# Patient Record
Sex: MALE | Race: WHITE | NOT HISPANIC OR LATINO | Employment: FULL TIME | URBAN - METROPOLITAN AREA
[De-identification: names, ages, dates, MRNs, and addresses within clinical notes are randomized per-mention and may not be internally consistent; named-entity substitution may affect disease eponyms.]

---

## 2017-03-20 ENCOUNTER — MYC MEDICAL ADVICE (OUTPATIENT)
Dept: FAMILY MEDICINE | Facility: CLINIC | Age: 57
End: 2017-03-20

## 2017-03-20 DIAGNOSIS — E03.9 ACQUIRED HYPOTHYROIDISM: ICD-10-CM

## 2017-03-22 RX ORDER — LEVOTHYROXINE SODIUM 88 UG/1
88 TABLET ORAL DAILY
Qty: 30 TABLET | Refills: 5 | Status: SHIPPED | OUTPATIENT
Start: 2017-03-22 | End: 2017-06-27 | Stop reason: DRUGHIGH

## 2017-03-22 NOTE — TELEPHONE ENCOUNTER
Please see mychart message from patient below. Please message patient back regarding your recommendations.  Anastasiia Castaneda CMA

## 2017-06-22 ENCOUNTER — DOCUMENTATION ONLY (OUTPATIENT)
Dept: LAB | Facility: CLINIC | Age: 57
End: 2017-06-22

## 2017-06-22 DIAGNOSIS — E03.9 HYPOTHYROIDISM, UNSPECIFIED TYPE: Primary | ICD-10-CM

## 2017-06-22 DIAGNOSIS — E03.9 HYPOTHYROIDISM, UNSPECIFIED TYPE: ICD-10-CM

## 2017-06-22 PROCEDURE — 84443 ASSAY THYROID STIM HORMONE: CPT | Performed by: FAMILY MEDICINE

## 2017-06-22 PROCEDURE — 84439 ASSAY OF FREE THYROXINE: CPT | Performed by: FAMILY MEDICINE

## 2017-06-22 PROCEDURE — 36415 COLL VENOUS BLD VENIPUNCTURE: CPT | Performed by: FAMILY MEDICINE

## 2017-06-22 NOTE — PROGRESS NOTES
Called patient and left message for patient to return call to team purple hotline.  Sandy Covington MA

## 2017-06-22 NOTE — PROGRESS NOTES
Call patient. He needs to be seen for annual male exam and health maintenance cares.   FREDO SCHILLING M.D.

## 2017-06-22 NOTE — LETTER
[facility]  [address]  [place] [address]  [phone]          July 3, 2017    Shaggy Pierce                                                                                                                     [address]  [place] [address]            Dear Shaggy,    We have been trying to reach you by phone, but have been unable to do so.    Our records show that you are due for an annual male exam and health maintenance cares.     Please call [phone], to schedule a visit.  Feel free to contact us with any questions or concerns.  Thank you.    Sincerely,         Jenae Gray MD/onel

## 2017-06-23 LAB
T4 FREE SERPL-MCNC: 1.09 NG/DL (ref 0.76–1.46)
TSH SERPL DL<=0.005 MIU/L-ACNC: 5.3 MU/L (ref 0.4–4)

## 2017-06-27 ENCOUNTER — MYC MEDICAL ADVICE (OUTPATIENT)
Dept: FAMILY MEDICINE | Facility: CLINIC | Age: 57
End: 2017-06-27

## 2017-06-27 DIAGNOSIS — E03.9 HYPOTHYROIDISM, UNSPECIFIED TYPE: Primary | ICD-10-CM

## 2017-06-27 DIAGNOSIS — E03.9 HYPOTHYROIDISM, UNSPECIFIED TYPE: ICD-10-CM

## 2017-06-27 RX ORDER — LEVOTHYROXINE SODIUM 100 UG/1
100 TABLET ORAL DAILY
Qty: 90 TABLET | Refills: 3 | Status: SHIPPED | OUTPATIENT
Start: 2017-06-27 | End: 2017-06-28

## 2017-06-28 RX ORDER — LEVOTHYROXINE SODIUM 100 UG/1
100 TABLET ORAL DAILY
Qty: 30 TABLET | Refills: 0 | Status: SHIPPED | OUTPATIENT
Start: 2017-06-28 | End: 2019-09-03

## 2017-07-03 NOTE — PROGRESS NOTES
Please send a letter to patient. Patient is due for physical. Unable to reach patient by phone.   Sandy Covington MA

## 2017-08-22 NOTE — PROGRESS NOTES
SUBJECTIVE:   Shaggy Pierce is a 56 year old male who presents to clinic today for the following health issues:      Medication Followup of Levothyroxine    Taking Medication as prescribed: yes    Side Effects:  Blood in semen x1 week    Medication Helping Symptoms: Unsure, but feels a little less tired             Problem list and histories reviewed & adjusted, as indicated.  Additional history: as documented    Patient Active Problem List   Diagnosis     CARDIOVASCULAR SCREENING; LDL GOAL LESS THAN 160     Nasal septal defect     Hypothyroidism, unspecified type     History of colonic polyps     Past Surgical History:   Procedure Laterality Date     COLONOSCOPY       COSMETIC SURGERY       HERNIA REPAIR  3/11     SEPTOPLASTY         Social History   Substance Use Topics     Smoking status: Never Smoker     Smokeless tobacco: Former User     Quit date: 1990     Alcohol use No     Family History   Problem Relation Age of Onset     Eye Disorder Mother      Thyroid Disease Mother      CANCER Father 63      of leukemia due to chemical exposure in      Cancer - colorectal Maternal Grandfather      HEART DISEASE Paternal Grandfather          Current Outpatient Prescriptions   Medication Sig Dispense Refill     levothyroxine (SYNTHROID/LEVOTHROID) 100 MCG tablet Take 1 tablet (100 mcg) by mouth daily 30 tablet 0     fluticasone (FLONASE) 50 MCG/ACT nasal spray Spray 2 sprays into both nostrils daily       Allergies   Allergen Reactions     Aspirin [Bufferin Low Dose] Hives     Seasonal Allergies      BP Readings from Last 3 Encounters:   17 (!) 140/98   16 132/80   07/29/15 136/85    Wt Readings from Last 3 Encounters:   17 217 lb (98.4 kg)   16 218 lb (98.9 kg)   07/29/15 208 lb 8 oz (94.6 kg)                  Labs reviewed in EPIC        Reviewed and updated as needed this visit by clinical staff       Reviewed and updated as needed this visit by Provider          ROS:  This 56 year old male is here today to get his thyroid rechecked. His synthroid was increased to 100 mcg 2 months ago and he has tolerated it well. He works full time down town [employer] and tries to stay active. However, he hasn't been able to ride his bike much this summer as he has a mass that has developed on his lower medial right knee. Sometimes it gets tender. No pain with walking, though. He still has his umbilical hernia and is not ready to have surgery yet.   He is concerned because he has seen blood in his seman more than once in the past 2 weeks. No dysuria. No family history of prostate cancer  However, he has a history of polyps and his paternal grandpa had colon cancer. He is due for another colonoscopy this year.   All other review of systems are negative  Personal, family, and social history reviewed with patient and revised.         OBJECTIVE:     BP (!) 140/98 (BP Location: Right arm, Cuff Size: Adult Regular)  Pulse 78  Temp 98.8  F (37.1  C) (Oral)  Wt 217 lb (98.4 kg)  SpO2 95%  BMI 32.99 kg/m2  Body mass index is 32.99 kg/(m^2).  GENERAL: healthy, alert and no distress  NECK: no adenopathy, no asymmetry, masses, or scars and thyroid normal to palpation  RESP: lungs clear to auscultation - no rales, rhonchi or wheezes  CV: regular rate and rhythm, normal S1 S2, no S3 or S4, no murmur, click or rub, no peripheral edema and peripheral pulses strong  ABDOMEN: soft, nontender, no hepatosplenomegaly, no masses and bowel sounds normal, but he has a reducible umbilical hernia. A little tender when I reduce it  No pain over his suprapubic area   MS: no gross musculoskeletal defects noted, no edema, but he has a non-tender mass over his medial, inferior right knee. Range of motion of knee is normal. Mass is very slightly tender.   Brisk gait with no limp   Well hydrated  Well nourished  Well groomed  Alert and oriented X 3  Good spirits    Diagnostic Test Results:  none      ASSESSMENT/PLAN:              1. Hypothyroidism, unspecified type  As above   - TSH with free T4 reflex    2. Hematospermia  As above   - UROLOGY ADULT REFERRAL  - UA reflex to Microscopic and Culture    3. History of colonic polyps  As above   - GASTROENTEROLOGY ADULT REF PROCEDURE ONLY    4. Mass of right knee  As above   - ORTHOPEDICS ADULT REFERRAL    5. Umbilical hernia without obstruction and without gangrene  As above, he is willing to wait on surgery     6. Screen for colon cancer  due  - GASTROENTEROLOGY ADULT REF PROCEDURE ONLY    7. Need for hepatitis C screening test  due  - Hepatitis C Screen Reflex to HCV RNA Quant and Genotype    8. CARDIOVASCULAR SCREENING; LDL GOAL LESS THAN 160  due  - Lipid panel reflex to direct LDL    9. Screening for diabetes mellitus  Due   - Glucose    Return to clinic if no improvement     FREDO SCHILLING MD  [facility] [place]    Results for orders placed or performed in visit on 08/25/17   Hepatitis C Screen Reflex to HCV RNA Quant and Genotype   Result Value Ref Range    Hepatitis C Antibody Nonreactive NR^Nonreactive   Lipid panel reflex to direct LDL   Result Value Ref Range    Cholesterol 228 (H) <200 mg/dL    Triglycerides 156 (H) <150 mg/dL    HDL Cholesterol 43 >39 mg/dL    LDL Cholesterol Calculated 154 (H) <100 mg/dL    Non HDL Cholesterol 185 (H) <130 mg/dL   Glucose   Result Value Ref Range    Glucose 90 70 - 99 mg/dL   UA reflex to Microscopic and Culture   Result Value Ref Range    Color Urine Yellow     Appearance Urine Clear     Glucose Urine Negative NEG^Negative mg/dL    Bilirubin Urine Negative NEG^Negative    Ketones Urine Negative NEG^Negative mg/dL    Specific Gravity Urine 1.020 1.003 - 1.035    Blood Urine Negative NEG^Negative    pH Urine 6.0 5.0 - 7.0 pH    Protein Albumin Urine Negative NEG^Negative mg/dL    Urobilinogen Urine 0.2 0.2 - 1.0 EU/dL    Nitrite Urine Negative NEG^Negative    Leukocyte Esterase Urine Negative NEG^Negative     Source Midstream Urine    TSH with free T4 reflex   Result Value Ref Range    TSH 3.17 0.40 - 4.00 mU/L   patient will be started on lipitor 20 mg daily.    FREDO SCHILLING M.D.

## 2017-08-25 ENCOUNTER — OFFICE VISIT (OUTPATIENT)
Dept: FAMILY MEDICINE | Facility: CLINIC | Age: 57
End: 2017-08-25
Payer: COMMERCIAL

## 2017-08-25 VITALS
WEIGHT: 217 LBS | BODY MASS INDEX: 32.99 KG/M2 | HEART RATE: 78 BPM | TEMPERATURE: 98.8 F | SYSTOLIC BLOOD PRESSURE: 140 MMHG | OXYGEN SATURATION: 95 % | DIASTOLIC BLOOD PRESSURE: 98 MMHG

## 2017-08-25 DIAGNOSIS — K42.9 UMBILICAL HERNIA WITHOUT OBSTRUCTION AND WITHOUT GANGRENE: ICD-10-CM

## 2017-08-25 DIAGNOSIS — R36.1 HEMATOSPERMIA: ICD-10-CM

## 2017-08-25 DIAGNOSIS — E78.5 HYPERLIPIDEMIA LDL GOAL <130: ICD-10-CM

## 2017-08-25 DIAGNOSIS — Z12.11 SCREEN FOR COLON CANCER: ICD-10-CM

## 2017-08-25 DIAGNOSIS — Z86.0100 HISTORY OF COLONIC POLYPS: ICD-10-CM

## 2017-08-25 DIAGNOSIS — R22.41 MASS OF RIGHT KNEE: ICD-10-CM

## 2017-08-25 DIAGNOSIS — Z13.1 SCREENING FOR DIABETES MELLITUS: ICD-10-CM

## 2017-08-25 DIAGNOSIS — Z11.59 NEED FOR HEPATITIS C SCREENING TEST: ICD-10-CM

## 2017-08-25 DIAGNOSIS — Z13.6 CARDIOVASCULAR SCREENING; LDL GOAL LESS THAN 160: ICD-10-CM

## 2017-08-25 DIAGNOSIS — E03.9 HYPOTHYROIDISM, UNSPECIFIED TYPE: Primary | ICD-10-CM

## 2017-08-25 LAB
ALBUMIN UR-MCNC: NEGATIVE MG/DL
APPEARANCE UR: CLEAR
BILIRUB UR QL STRIP: NEGATIVE
CHOLEST SERPL-MCNC: 228 MG/DL
COLOR UR AUTO: YELLOW
GLUCOSE SERPL-MCNC: 90 MG/DL (ref 70–99)
GLUCOSE UR STRIP-MCNC: NEGATIVE MG/DL
HDLC SERPL-MCNC: 43 MG/DL
HGB UR QL STRIP: NEGATIVE
KETONES UR STRIP-MCNC: NEGATIVE MG/DL
LDLC SERPL CALC-MCNC: 154 MG/DL
LEUKOCYTE ESTERASE UR QL STRIP: NEGATIVE
NITRATE UR QL: NEGATIVE
NONHDLC SERPL-MCNC: 185 MG/DL
PH UR STRIP: 6 PH (ref 5–7)
SOURCE: NORMAL
SP GR UR STRIP: 1.02 (ref 1–1.03)
TRIGL SERPL-MCNC: 156 MG/DL
TSH SERPL DL<=0.005 MIU/L-ACNC: 3.17 MU/L (ref 0.4–4)
UROBILINOGEN UR STRIP-ACNC: 0.2 EU/DL (ref 0.2–1)

## 2017-08-25 PROCEDURE — 36415 COLL VENOUS BLD VENIPUNCTURE: CPT | Performed by: FAMILY MEDICINE

## 2017-08-25 PROCEDURE — 84443 ASSAY THYROID STIM HORMONE: CPT | Performed by: FAMILY MEDICINE

## 2017-08-25 PROCEDURE — 80061 LIPID PANEL: CPT | Performed by: FAMILY MEDICINE

## 2017-08-25 PROCEDURE — 99214 OFFICE O/P EST MOD 30 MIN: CPT | Performed by: FAMILY MEDICINE

## 2017-08-25 PROCEDURE — 82947 ASSAY GLUCOSE BLOOD QUANT: CPT | Performed by: FAMILY MEDICINE

## 2017-08-25 PROCEDURE — 86803 HEPATITIS C AB TEST: CPT | Performed by: FAMILY MEDICINE

## 2017-08-25 PROCEDURE — 81003 URINALYSIS AUTO W/O SCOPE: CPT | Performed by: FAMILY MEDICINE

## 2017-08-25 ASSESSMENT — PAIN SCALES - GENERAL: PAINLEVEL: NO PAIN (0)

## 2017-08-25 NOTE — NURSING NOTE
"Chief Complaint   Patient presents with     Recheck Medication     Levothyroxine dose increase follow-up. Blood is semen possibly due to med increase. Pt is fasting.       Initial BP (!) 140/98 (BP Location: Right arm, Cuff Size: Adult Regular)  Pulse 78  Temp 98.8  F (37.1  C) (Oral)  Wt 217 lb (98.4 kg)  SpO2 95%  BMI 32.99 kg/m2 Estimated body mass index is 32.99 kg/(m^2) as calculated from the following:    Height as of 3/31/16: 5' 8\" (1.727 m).    Weight as of this encounter: 217 lb (98.4 kg).  Medication Reconciliation: complete       Adriana Arreaga CMA      "

## 2017-08-25 NOTE — PATIENT INSTRUCTIONS
[facility]    If you have any questions regarding to your visit please contact your care team:       Team Purple:   Clinic Hours Telephone Number   Dr. Jenae Faulkner     7am-7pm  Monday - Thursday   7am-5pm  Fridays  [phone]  (Appointment scheduling available 24/7)    Questions about your Visit?   Team Line:  [phone]   Urgent Care - [place] and [facility] - 11am-9pm Monday-Friday Saturday-Sunday- 9am-5pm   [place] - 5pm-9pm Monday-Friday Saturday-Sunday- 9am-5pm  [phone] - [facility]  [phone] - [place]       What options do I have for visits at the clinic other than the traditional office visit?  To expand how we care for you, many of our providers are utilizing electronic visits (e-visits) and telephone visits, when medically appropriate, for interactions with their patients rather than a visit in the clinic.   We also offer nurse visits for many medical concerns. Just like any other service, we will bill your insurance company for this type of visit based on time spent on the phone with your provider. Not all insurance companies cover these visits. Please check with your medical insurance if this type of visit is covered. You will be responsible for any charges that are not paid by your insurance.      E-visits via C3[employer]:  generally incur a $35.00 fee.  Telephone visits:  Time spent on the phone: *charged based on time that is spent on the phone in increments of 10 minutes. Estimated cost:   5-10 mins $30.00   11-20 mins. $59.00   21-30 mins. $85.00     Use [employer] (secure email communication and access to your chart) to send your primary care provider a message or make an appointment. Ask someone on your Team how to sign up for [employer].  For a Price Quote for your services, please call our Consumer Price Line at [phone].  As always, Thank you for trusting us with your health care needs!

## 2017-08-25 NOTE — MR AVS SNAPSHOT
After Visit Summary   8/25/2017    Shaggy Pierce    MRN: [id number]           Patient Information     Date Of Birth          1960        Visit Information        Provider Department      8/25/2017 10:30 AM Jenae Gray MD [facility]        Today's Diagnoses     Hypothyroidism, unspecified type    -  1    Hematospermia        Screen for colon cancer        History of colonic polyps        Mass of right knee        Need for hepatitis C screening test        CARDIOVASCULAR SCREENING; LDL GOAL LESS THAN 160        Screening for diabetes mellitus          Care Instructions    [facility]    If you have any questions regarding to your visit please contact your care team:       Team Purple:   Clinic Hours Telephone Number   Dr. Jenae Faulkner     7am-7pm  Monday - Thursday   7am-5pm  Fridays  [phone]  (Appointment scheduling available 24/7)    Questions about your Visit?   Team Line:  [phone]   [facility] [place] [facility] 11am-9pm Monday-Friday Saturday-Sunday- 9am-5pm   [place] - 5pm-9pm Monday-Friday Saturday-Sunday- 9am-5pm  [phone] - [place]   [phone] - [place]       What options do I have for visits at the clinic other than the traditional office visit?  To expand how we care for you, many of our providers are utilizing electronic visits (e-visits) and telephone visits, when medically appropriate, for interactions with their patients rather than a visit in the clinic.   We also offer nurse visits for many medical concerns. Just like any other service, we will bill your insurance company for this type of visit based on time spent on the phone with your provider. Not all insurance companies cover these visits. Please check with your medical insurance if this type of visit is covered. You will be responsible for any charges that are not paid by your insurance.      E-visits  via Rafter:  generally incur a $35.00 fee.  Telephone visits:  Time spent on the phone: *charged based on time that is spent on the phone in increments of 10 minutes. Estimated cost:   5-10 mins $30.00   11-20 mins. $59.00   21-30 mins. $85.00     Use ViralGainshart (secure email communication and access to your chart) to send your primary care provider a message or make an appointment. Ask someone on your Team how to sign up for Rafter.  For a Price Quote for your services, please call our [employer] Price Line at [phone].  As always, Thank you for trusting us with your health care needs!              Follow-ups after your visit        Additional Services     GASTROENTEROLOGY ADULT REF PROCEDURE ONLY       Last Lab Result: No results found for: CR  There is no height or weight on file to calculate BMI.     Needed:  No  Language:  English    Patient will be contacted to schedule procedure.     Please be aware that coverage of these services is subject to the terms and limitations of your health insurance plan.  Call member services at your health plan with any benefit or coverage questions.  Any procedures must be performed at a [facility] facility OR coordinated by your clinic's referral office.    Please bring the following with you to your appointment:    (1) Any X-Rays, CTs or MRIs which have been performed.  Contact the facility where they were done to arrange for  prior to your scheduled appointment.    (2) List of current medications   (3) This referral request   (4) Any documents/labs given to you for this referral            ORTHOPEDICS ADULT REFERRAL       Your provider has referred you to: [facility] [place] [phone]    [email or web address]    Please be aware that coverage of these services is subject to the terms and limitations of your health insurance plan.  Call member services at your health plan with any benefit or coverage questions.       Please bring the following to your appointment:    >>   Any x-rays, CTs or MRIs which have been performed.  Contact the facility where they were done to arrange for  prior to your scheduled appointment.    >>   List of current medications   >>   This referral request   >>   Any documents/labs given to you for this referral            UROLOGY ADULT REFERRAL       Your provider has referred you to: [facility]: [facility] [phone]   [email or web address]    Please be aware that coverage of these services is subject to the terms and limitations of your health insurance plan.  Call member services at your health plan with any benefit or coverage questions.      Please bring the following with you to your appointment:    (1) Any X-Rays, CTs or MRIs which have been performed.  Contact the facility where they were done to arrange for  prior to your scheduled appointment.    (2) List of current medications  (3) This referral request   (4) Any documents/labs given to you for this referral                  Who to contact     If you have questions or need follow up information about today's clinic visit or your schedule please contact [facility] directly at [phone].  Normal or non-critical lab and imaging results will be communicated to you by MyChart, letter or phone within 4 business days after the clinic has received the results. If you do not hear from us within 7 days, please contact the clinic through MyChart or phone. If you have a critical or abnormal lab result, we will notify you by phone as soon as possible.  Submit refill requests through [employer] or call your pharmacy and they will forward the refill request to us. Please allow 3 business days for your refill to be completed.          Additional Information About Your Visit        [employer] Information     [employer] gives you secure access to your electronic health record. If you see a  primary care provider, you can also send messages to your care team and make appointments. If you have questions, please call your primary care clinic.  If you do not have a primary care provider, please call [phone] and they will assist you.        Care EveryWhere ID     This is your Care EveryWhere ID. This could be used by other organizations to access your [facility] medical records  [id number]        Your Vitals Were     Pulse Temperature Pulse Oximetry BMI (Body Mass Index)          78 98.8  F (37.1  C) (Oral) 95% 32.99 kg/m2         Blood Pressure from Last 3 Encounters:   08/25/17 (!) 140/98   03/31/16 132/80   07/29/15 136/85    Weight from Last 3 Encounters:   08/25/17 217 lb (98.4 kg)   03/31/16 218 lb (98.9 kg)   07/29/15 208 lb 8 oz (94.6 kg)              We Performed the Following     GASTROENTEROLOGY ADULT REF PROCEDURE ONLY     Glucose     Hepatitis C Screen Reflex to HCV RNA Quant and Genotype     Lipid panel reflex to direct LDL     ORTHOPEDICS ADULT REFERRAL     TSH with free T4 reflex     UA reflex to Microscopic and Culture     UROLOGY ADULT REFERRAL        Primary Care Provider Office Phone # Fax #    Jenae Gray [phone]       [address] [address]        Equal Access to Services     BRIA TAYLOR AH: Hadii aad ku hadasho Soomaali, waaxda luqadaha, qaybta kaalmada adeegyada, waxay ruben hayfay vazquez. So [facility] [place] [facility] [phone].    ATENCIÓN: Si habla español, tiene a silverman disposición servicios gratuitos de asistencia lingüística. Llame al [phone].    We comply with applicable federal civil rights laws and [place] laws. We do not discriminate on the basis of race, color, national origin, age, disability sex, sexual orientation or gender identity.            Thank you!     Thank you for choosing [facility]  for your care. Our goal is always to provide you with excellent care. Hearing  back from our patients is one way we can continue to improve our services. Please take a few minutes to complete the written survey that you may receive in the mail after your visit with us. Thank you!             Your Updated Medication List - Protect others around you: Learn how to safely use, store and throw away your medicines at www.disposemymeds.org.          This list is accurate as of: 8/25/17 10:52 AM.  Always use your most recent med list.                   Brand Name Dispense Instructions for use Diagnosis    FLONASE 50 MCG/ACT spray   Generic drug:  fluticasone      Spray 2 sprays into both nostrils daily        levothyroxine 100 MCG tablet    SYNTHROID/LEVOTHROID    30 tablet    Take 1 tablet (100 mcg) by mouth daily    Hypothyroidism, unspecified type

## 2017-08-25 NOTE — LETTER
[facility]  [address] [place] [address]    August 29, 2017    Shaggy Pierce  [address]  [place] [address]          Dear Shaggy,    Your lipid panel has worsened significantly since your last lipid panel was done. I have sent a prescription for lipitor to your [facility] pharmacy. Please take one daily with your evening meal. See me again in 2 months to recheck your cholesterol, fasting    Enclosed is a copy of your results.     Results for orders placed or performed in visit on 08/25/17   Hepatitis C Screen Reflex to HCV RNA Quant and Genotype   Result Value Ref Range    Hepatitis C Antibody Nonreactive NR^Nonreactive   Lipid panel reflex to direct LDL   Result Value Ref Range    Cholesterol 228 (H) <200 mg/dL    Triglycerides 156 (H) <150 mg/dL    HDL Cholesterol 43 >39 mg/dL    LDL Cholesterol Calculated 154 (H) <100 mg/dL    Non HDL Cholesterol 185 (H) <130 mg/dL   Glucose   Result Value Ref Range    Glucose 90 70 - 99 mg/dL   UA reflex to Microscopic and Culture   Result Value Ref Range    Color Urine Yellow     Appearance Urine Clear     Glucose Urine Negative NEG^Negative mg/dL    Bilirubin Urine Negative NEG^Negative    Ketones Urine Negative NEG^Negative mg/dL    Specific Gravity Urine 1.020 1.003 - 1.035    Blood Urine Negative NEG^Negative    pH Urine 6.0 5.0 - 7.0 pH    Protein Albumin Urine Negative NEG^Negative mg/dL    Urobilinogen Urine 0.2 0.2 - 1.0 EU/dL    Nitrite Urine Negative NEG^Negative    Leukocyte Esterase Urine Negative NEG^Negative    Source Midstream Urine    TSH with free T4 reflex   Result Value Ref Range    TSH 3.17 0.40 - 4.00 mU/L       If you have any questions or concerns, please call myself or my nurse at [phone].      Sincerely,        Jenae Gray MD/JER

## 2017-08-27 LAB — HCV AB SERPL QL IA: NONREACTIVE

## 2017-08-29 PROBLEM — E78.5 HYPERLIPIDEMIA LDL GOAL <130: Status: ACTIVE | Noted: 2017-08-29

## 2017-08-29 RX ORDER — ATORVASTATIN CALCIUM 20 MG/1
20 TABLET, FILM COATED ORAL DAILY
Qty: 90 TABLET | Refills: 4 | Status: SHIPPED | OUTPATIENT
Start: 2017-08-29

## 2017-10-29 ENCOUNTER — HEALTH MAINTENANCE LETTER (OUTPATIENT)
Age: 57
End: 2017-10-29

## 2017-11-10 ENCOUNTER — OFFICE VISIT (OUTPATIENT)
Dept: ORTHOPEDICS | Facility: CLINIC | Age: 57
End: 2017-11-10
Payer: COMMERCIAL

## 2017-11-10 ENCOUNTER — RADIANT APPOINTMENT (OUTPATIENT)
Dept: GENERAL RADIOLOGY | Facility: CLINIC | Age: 57
End: 2017-11-10
Attending: ORTHOPAEDIC SURGERY
Payer: COMMERCIAL

## 2017-11-10 VITALS
HEIGHT: 68 IN | OXYGEN SATURATION: 96 % | RESPIRATION RATE: 16 BRPM | BODY MASS INDEX: 34.37 KG/M2 | SYSTOLIC BLOOD PRESSURE: 157 MMHG | DIASTOLIC BLOOD PRESSURE: 92 MMHG | HEART RATE: 79 BPM | WEIGHT: 226.8 LBS

## 2017-11-10 DIAGNOSIS — G89.29 CHRONIC PAIN OF RIGHT KNEE: Primary | ICD-10-CM

## 2017-11-10 DIAGNOSIS — R22.41 KNEE MASS, RIGHT: ICD-10-CM

## 2017-11-10 DIAGNOSIS — M25.561 CHRONIC PAIN OF RIGHT KNEE: Primary | ICD-10-CM

## 2017-11-10 PROCEDURE — 73562 X-RAY EXAM OF KNEE 3: CPT | Mod: RT

## 2017-11-10 PROCEDURE — 99244 OFF/OP CNSLTJ NEW/EST MOD 40: CPT | Performed by: ORTHOPAEDIC SURGERY

## 2017-11-10 ASSESSMENT — PAIN SCALES - GENERAL: PAINLEVEL: SEVERE PAIN (7)

## 2017-11-10 NOTE — PROGRESS NOTES
Chief Complaint:   Chief Complaint   Patient presents with     Knee Pain     Right knee mass and pain. Onset: 7/2017 - mass, pain for years. Patient states he has knee pain for years but noticed a mass his summer. He rides bike during the summer but this summer things went sideways because of the pain. He has wore a copper knee sleeve in the past. Pain is medial/inferior over the mass, worse over the top of the mass. No treatments.      Shaggy Pierce is seen today in the [facility] for evaluation of right knee mass at the request of Dr. Jenae Gray     HPI: Shaggy Pierce is a 57 year old male who presents for evaluation and management of a right knee mass. The mass as been noticed for 4 months, since 7/2017. No known injury.  Prior to mass, he had soreness in the knee, medially. Possible injury in Winter 2016 after slipping while getting off the train. Reports previously biking 3-4 miles a day. Did not wear compression socks as he usually did pervious summers, and feels that this may have contributed to the mass formation. Today his pain is severe, rated a 7/10. Pain is located over the medial/inferior knee. Pain is worst over the top of the mass Prolonged standing, up and down stairs. He does notice some popping in the knee. He reports having mild pain/discomfort around the mass site. He denies associated numbness or tingling. He denies any associated injuries or punctures to his leg in the area of the mass. Denies locking, catching or giving way.      Changes in size: Yes , fluctuates with activities per his report  Changes in color: No   Tenderness of mass: Yes   Hot/cold sensitivity: No   Pain severity: 7/10  Pain quality: aching  Frequency of symptoms: are constant.  Night pain: No   Fevers, chills, night sweats: No   Changes in weight (gain or loss): No   Feeling ill, malaise:No   Family history of bone tumors, cancers: No     Patient has tried:     NSAIDS: Yes      Physical  Therapy: No      Activity modification: Yes      Bracing: No      Injections: No     Ice: No      Other: None      Previous treatment: none    Past medical history:  has a past medical history of Thyroid disease (1/1/2000). He also has no past medical history of Arthritis; Cancer (H); Cerebral infarction (H); Congestive heart failure (H); COPD (chronic obstructive pulmonary disease) (H); Depressive disorder; Diabetes (H); Heart disease; History of blood transfusion; Hypertension; or Uncomplicated asthma.   Patient Active Problem List   Diagnosis     CARDIOVASCULAR SCREENING; LDL GOAL LESS THAN 160     Nasal septal defect     Hypothyroidism, unspecified type     History of colonic polyps     Umbilical hernia without obstruction and without gangrene     Hyperlipidemia LDL goal <130       Past surgical history:  has a past surgical history that includes Septoplasty (12/12); colonoscopy (8/12); Cosmetic surgery (9/13); and hernia repair (3/11).     Medications:    Current Outpatient Prescriptions   Medication Sig Dispense Refill     atorvastatin (LIPITOR) 20 MG tablet Take 1 tablet (20 mg) by mouth daily 90 tablet 4     levothyroxine (SYNTHROID/LEVOTHROID) 100 MCG tablet Take 1 tablet (100 mcg) by mouth daily 30 tablet 0     fluticasone (FLONASE) 50 MCG/ACT nasal spray Spray 2 sprays into both nostrils daily          Allergies:     Allergies   Allergen Reactions     Aspirin [Bufferin Low Dose] Hives     Seasonal Allergies         Family History: family history includes CANCER (age of onset: 63) in his father; Cancer - colorectal in his maternal grandfather; Eye Disorder in his mother; HEART DISEASE in his paternal grandfather; Thyroid Disease in his mother.     Social History: ITmanager..  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 He quit smokeless tobacco use about 27 years ago. He reports that he does not drink alcohol or use illicit drugs.    Review of Systems:  ROS: 10 point ROS neg other than the symptoms noted above in  "the HPI and past medical history.    Physical Exam  GENERAL APPEARANCE: healthy, alert, no distress.   SKIN: no suspicious lesions or rashes  NEURO: Normal strength and tone, mentation intact and speech normal  PSYCH:  mentation appears normal and affect normal. Not anxious.  RESPIRATORY: No increased work of breathing.    BP (!) 157/92  Pulse 79  Resp 16  Ht 1.727 m (5' 8\")  Wt 102.9 kg (226 lb 12.8 oz)  SpO2 96%  BMI 34.48 kg/m2     BILATERAL LOWER EXTREMITIES:  Gait: normal  Alignment: varus  No gross deformities or masses.  No Quad atrophy, strength normal.  Intact sensation deep peroneal nerve, superficial peroneal nerve, med/lat tibial nerve, sural nerve, saphenous nerve  Intact EHL, EDL, TA, FHL, GS, quadriceps hamstrings and hip flexors  Toes warm and well perfused, brisk capillary refill. Palpable 2+ dp pulses.  Bilateral calf soft and nttp or squeeze.  No palpable popliteal lymphadenopathy.  DTRs: achilles 2+, patella 2+.  Edema: none  Hips with full, pain-free motion. No irritability with flexion, adduction, and internal rotation.    RIGHT KNEE EXAM:    Skin: intact, no ecchymosis or erythema  Noticeable round, soft tissue mass, ~5 cm diameter, located over adjacent just distal to medial joint line, over pes anserine bursa region   Mass does transiluminate with light.  Mass not tender to palpation. Soft, fluctuant.  Negative Tinel's over mass.  No other observable or palpable masses of the leg, foot, knee.    Squat: 100 %, with anterior knee pain.     ROM: 0 extension to 130 flexion (0 extension to 130 flexion on the left)  Tight hamstrings on straight leg raise.  Effusion: none  Tender: medial joint line   NTTP lat joint line, anterior or posterior knee  McMurrays: positive - medially    MCL: stable, and non-painful at both 0 and 30 degrees knee flexion  Varus stress: stable, and non-painful at both 0 and 30 degrees knee flexion  Lachmans: neg, firm endpoint  Posterior Drawer stable  Patellofemoral " joint:                Apprehension: negative              Crepitations: minimal    LEFT KNEE EXAM:    Skin: intact, no ecchymosis or erythema  Squat: 100 %, with anterior knee pain.     ROM: 0 extension to 130 flexion   Tight hamstrings on straight leg raise.  Effusion: none  Tender: NTTP med/lat joint line, anterior or posterior knee  McMurrays: negative    MCL: stable, and non-painful at both 0 and 30 degrees knee flexion  Varus stress: stable, and non-painful at both 0 and 30 degrees knee flexion  Lachmans: neg, firm endpoint  Posterior Drawer stable  Patellofemoral joint:                Apprehension: negative              Crepitations: minimal    X-rays:  3 views right knee from 11/10/2017 were reviewed in clinic today.  No obvious fractures or dislocations. Mild degenerative changes.    Assessment: Shaggy Pierce is a 57 year old male with right knee pain, likely medial meniscus tear and perimeniscal cyst.    Plan:  * Reviewed imaging with patient. Also, clinical exam findings.  * Discussed with patient that based on physical exam findings, it is not possible to completely rule out medial meniscus tear with an associated cyst.  * An MRI of the right knee was ordered for further evaluation.     * Rest  * Activity modification - avoid activities that aggravate symptoms.  * NSAIDS - regular use for inflammation, with food, as long as no contra-indications. Please discuss with pcp if needed.  * Ice twice daily to three times daily.  * Compression wrap as needed.  * Elevation of extremity to reduce swelling  * Tylenol as needed for pain    * After having the MRI, I would like the patient to call me so that we can discuss the results as well as how to proceed.       The information in this document, created by a scribe for me, accurately reflects the services I personally performed and the decisions made by me. I have reviewed and approved this document for accuracy.      Epifanio Manjarrez M.D., M.S.  Dept. of  Orthopaedic Surgery  [facility]

## 2017-11-10 NOTE — NURSING NOTE
"Chief Complaint   Patient presents with     Knee Pain     Right knee mass and pain. Onset: 7/2017 - mass, pain for years. Patient states he has knee pain for years but noticed a mass his summer. He rides bike during the summer but this summer things went sideways because of the pain. He has wore a copper knee sleeve in the past. Pain is medial/inferior over the mass, worse over the top of the mass. No treatments.       Initial BP (!) 157/92  Pulse 79  Resp 16  Ht 1.727 m (5' 8\")  Wt 102.9 kg (226 lb 12.8 oz)  SpO2 96%  BMI 34.48 kg/m2 Estimated body mass index is 34.48 kg/(m^2) as calculated from the following:    Height as of this encounter: 1.727 m (5' 8\").    Weight as of this encounter: 102.9 kg (226 lb 12.8 oz).  Medication Reconciliation: leonel Park Certified Medical Assistant    "

## 2017-11-10 NOTE — MR AVS SNAPSHOT
After Visit Summary   11/10/2017    Shaggy Pierce    MRN: [id number]           Patient Information     Date Of Birth          1960        Visit Information        Provider Department      11/10/2017 9:00 AM Epifanio Manjarrez MD [facility] [place]        Today's Diagnoses     Chronic pain of right knee    -  1    Knee mass, right          Care Instructions    Please remember to call and schedule a follow up appointment if one was recommended at your earliest convenience.  Orthopedics CLINIC HOURS TELEPHONE NUMBER   Dr. Loki Laurent  Certified Medical Assistant   Monday & Wednesday   8am - 5pm  Thursday 1pm - 5pm  Friday 8am -11:30am Specialty schedulers:   [phone] to schedule your surgery.  Main Clinic:   [phone] to make an appointment with any provider.    Urgent Care locations:    [facility] Monday-Friday Closed  Saturday-Sunday 9am-5pm      Monday-Friday 12pm - 8pm  Saturday-Sunday 9am-5pm [phone]     If SURGERY has been recommended, please call our Specialty Schedulers at [phone] to schedule your procedure.    If you need a medication refill, please contact your pharmacy. Please allow 3 business days for your refill to be completed.    If an MRI or CT scan has been recommended, please call [facility] Schedulers at [phone] to schedule your appointment.  Use [employer] (secure e-mail communication and access to your chart) to send a message or to make an appointment. Please ask how you can sign up for [employer].  Your care team's suggested websites for health information:   [email or web address] : Up to date and easily searchable information on multiple topics.   [email or web address] : [place] dept of heat[place], public health issues in [place], N1N1              Follow-ups after your visit        Your next 10 appointments already scheduled     Nov 14, 2017  5:45 PM CST   MR KNEE RIGHT W/O & W CONTRAST with [id number]    [facility]    [id number]  [place] [address] [phone]           Take your medicines as usual, unless your doctor tells you not to. Bring a list of your current medicines to your exam (including vitamins, minerals and over-the-counter drugs).  You will be given intravenous contrast for this exam. To prepare:   The day before your exam, drink extra fluids at least six 8-ounce glasses (unless your doctor tells you to restrict your fluids).   Have a blood test (creatinine test) within 30 days of your exam. Go to your clinic or Diagnostic Imaging Department for this test.  The MRI machine uses a strong magnet. Please wear clothes without metal (snaps, zippers). A sweatsuit works well, or we may give you a hospital gown.  Please remove any body piercings and hair extensions before you arrive. You will also remove watches, jewelry, hairpins, wallets, dentures, partial dental plates and hearing aids. You may wear contact lenses, and you may be able to wear your rings. We have a safe place to keep your personal items, but it is safer to leave them at home.   **IMPORTANT** THE INSTRUCTIONS BELOW ARE ONLY FOR THOSE PATIENTS WHO HAVE BEEN TOLD THEY WILL RECEIVE SEDATION OR GENERAL ANESTHESIA DURING THEIR MRI PROCEDURE:  IF YOU WILL RECEIVE SEDATION (take medicine to help you relax during your exam):   You must get the medicine from your doctor before you arrive. Bring the medicine to the exam. Do not take it at home.   Arrive one hour early. Bring someone who can take you home after the test. Your medicine will make you sleepy. After the exam, you may not drive, take a bus or take a taxi by yourself.   No eating 8 hours before your exam. You may have clear liquids up until 4 hours before your exam. (Clear liquids include water, clear tea, black coffee and fruit juice without pulp.)  IF YOU WILL RECEIVE ANESTHESIA (be asleep for your exam):   Arrive 1 1/2 hours  "early. Bring someone who can take you home after the test. You may not drive, take a bus or take a taxi by yourself.   No eating 8 hours before your exam. You may have clear liquids up until 4 hours before your exam. (Clear liquids include water, clear tea, black coffee and fruit juice without pulp.)  Please call the Imaging Department at your exam site with any questions.              Who to contact     If you have questions or need follow up information about today's clinic visit or your schedule please contact [facility] directly at [phone].  Normal or non-critical lab and imaging results will be communicated to you by [employer], letter or phone within 4 business days after the clinic has received the results. If you do not hear from us within 7 days, please contact the clinic through [employer] or phone. If you have a critical or abnormal lab result, we will notify you by phone as soon as possible.  Submit refill requests through [employer] or call your pharmacy and they will forward the refill request to us. Please allow 3 business days for your refill to be completed.          Additional Information About Your Visit        MyChart Information     [employer] gives you secure access to your electronic health record. If you see a primary care provider, you can also send messages to your care team and make appointments. If you have questions, please call your primary care clinic.  If you do not have a primary care provider, please call [phone] and they will assist you.        Care EveryWhere ID     This is your Care EveryWhere ID. This could be used by other organizations to access your [place] medical records  [id number]        Your Vitals Were     Pulse Respirations Height Pulse Oximetry BMI (Body Mass Index)       79 16 5' 8\" (1.727 m) 96% 34.48 kg/m2        Blood Pressure from Last 3 Encounters:   11/10/17 (!) 157/92   08/25/17 (!) 140/98   03/31/16 132/80    Weight from Last 3 Encounters: "   11/10/17 226 lb 12.8 oz (102.9 kg)   08/25/17 217 lb (98.4 kg)   03/31/16 218 lb (98.9 kg)               Primary Care Provider Office Phone # Fax #    Jenae Gray [phone]       [address] [address]        Equal Access to Services     BRIA TAYLOR : Hadii aad ku hadasho Soomaali, waaxda luqadaha, qaybta kaalmada adeegyada, waxay idiin hayaan candace hernandezalexxwashington vazquez. So [facility] [phone].    ATENCIÓN: Si habla español, tiene a silverman disposición servicios gratuitos de asistencia lingüística. Sachin al [phone].    We comply with applicable federal civil rights laws and [place] laws. We do not discriminate on the basis of race, color, national origin, age, disability, sex, sexual orientation, or gender identity.            Thank you!     Thank you for choosing [facility]  for your care. Our goal is always to provide you with excellent care. Hearing back from our patients is one way we can continue to improve our services. Please take a few minutes to complete the written survey that you may receive in the mail after your visit with us. Thank you!             Your Updated Medication List - Protect others around you: Learn how to safely use, store and throw away your medicines at www.disposemymeds.org.          This list is accurate as of: 11/10/17 11:59 PM.  Always use your most recent med list.                   Brand Name Dispense Instructions for use Diagnosis    atorvastatin 20 MG tablet    LIPITOR    90 tablet    Take 1 tablet (20 mg) by mouth daily    Hyperlipidemia LDL goal <130       FLONASE 50 MCG/ACT spray   Generic drug:  fluticasone      Spray 2 sprays into both nostrils daily        levothyroxine 100 MCG tablet    SYNTHROID/LEVOTHROID    30 tablet    Take 1 tablet (100 mcg) by mouth daily    Hypothyroidism, unspecified type

## 2017-11-10 NOTE — LETTER
11/10/2017         RE: Shaggy Pierce  [address]  [place] [address]        Dear Colleague,    Thank you for referring your patient, Shaggy Pierce, to the [facility]. Please see a copy of my visit note below.    Chief Complaint:   Chief Complaint   Patient presents with     Knee Pain     Right knee mass and pain. Onset: 7/2017 - mass, pain for years. Patient states he has knee pain for years but noticed a mass his summer. He rides bike during the summer but this summer things went sideways because of the pain. He has wore a copper knee sleeve in the past. Pain is medial/inferior over the mass, worse over the top of the mass. No treatments.      Shaggy Pierce is seen today in the [facility] for evaluation of right knee mass at the request of Dr. Jenae Gray     HPI: Shaggy Pierce is a 57 year old male who presents for evaluation and management of a right knee mass. The mass as been noticed for 4 months, since 7/2017. No known injury.  Prior to mass, he had soreness in the knee, medially. Possible injury in Winter 2016 after slipping while getting off the train. Reports previously biking 3-4 miles a day. Did not wear compression socks as he usually did pervious summers, and feels that this may have contributed to the mass formation. Today his pain is severe, rated a 7/10. Pain is located over the medial/inferior knee. Pain is worst over the top of the mass Prolonged standing, up and down stairs. He does notice some popping in the knee. He reports having mild pain/discomfort around the mass site. He denies associated numbness or tingling. He denies any associated injuries or punctures to his leg in the area of the mass. Denies locking, catching or giving way.      Changes in size: Yes , fluctuates with activities per his report  Changes in color: No   Tenderness of mass: Yes   Hot/cold sensitivity: No   Pain severity: 7/10  Pain quality: aching  Frequency of  symptoms: are constant.  Night pain: No   Fevers, chills, night sweats: No   Changes in weight (gain or loss): No   Feeling ill, malaise:No   Family history of bone tumors, cancers: No     Patient has tried:     NSAIDS: Yes      Physical Therapy: No      Activity modification: Yes      Bracing: No      Injections: No     Ice: No      Other: None      Previous treatment: none    Past medical history:  has a past medical history of Thyroid disease (1/1/2000). He also has no past medical history of Arthritis; Cancer (H); Cerebral infarction (H); Congestive heart failure (H); COPD (chronic obstructive pulmonary disease) (H); Depressive disorder; Diabetes (H); Heart disease; History of blood transfusion; Hypertension; or Uncomplicated asthma.   Patient Active Problem List   Diagnosis     CARDIOVASCULAR SCREENING; LDL GOAL LESS THAN 160     Nasal septal defect     Hypothyroidism, unspecified type     History of colonic polyps     Umbilical hernia without obstruction and without gangrene     Hyperlipidemia LDL goal <130       Past surgical history:  has a past surgical history that includes Septoplasty (12/12); colonoscopy (8/12); Cosmetic surgery (9/13); and hernia repair (3/11).     Medications:    Current Outpatient Prescriptions   Medication Sig Dispense Refill     atorvastatin (LIPITOR) 20 MG tablet Take 1 tablet (20 mg) by mouth daily 90 tablet 4     levothyroxine (SYNTHROID/LEVOTHROID) 100 MCG tablet Take 1 tablet (100 mcg) by mouth daily 30 tablet 0     fluticasone (FLONASE) 50 MCG/ACT nasal spray Spray 2 sprays into both nostrils daily          Allergies:     Allergies   Allergen Reactions     Aspirin [Bufferin Low Dose] Hives     Seasonal Allergies         Family History: family history includes CANCER (age of onset: 63) in his father; Cancer - colorectal in his maternal grandfather; Eye Disorder in his mother; HEART DISEASE in his paternal grandfather; Thyroid Disease in his mother.     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 He quit smokeless tobacco use about 27 years ago. He reports that he does not drink alcohol or use illicit drugs.    Review of Systems:  ROS: 10 point ROS neg other than the symptoms noted above in the HPI and past medical history.    Physical Exam  GENERAL APPEARANCE: healthy, alert, no distress.   SKIN: no suspicious lesions or rashes  NEURO: Normal strength and tone, mentation intact and speech normal  PSYCH:  mentation appears normal and affect normal. Not anxious.  RESPIRATORY: No increased work of breathing.    BP (!) 157/92  Pulse 79  Resp 16  Ht 1.727 m (5' 8\")  Wt 102.9 kg (226 lb 12.8 oz)  SpO2 96%  BMI 34.48 kg/m2     BILATERAL LOWER EXTREMITIES:  Gait: normal  Alignment: varus  No gross deformities or masses.  No Quad atrophy, strength normal.  Intact sensation deep peroneal nerve, superficial peroneal nerve, med/lat tibial nerve, sural nerve, saphenous nerve  Intact EHL, EDL, TA, FHL, GS, quadriceps hamstrings and hip flexors  Toes warm and well perfused, brisk capillary refill. Palpable 2+ dp pulses.  Bilateral calf soft and nttp or squeeze.  No palpable popliteal lymphadenopathy.  DTRs: achilles 2+, patella 2+.  Edema: none  Hips with full, pain-free motion. No irritability with flexion, adduction, and internal rotation.    RIGHT KNEE EXAM:    Skin: intact, no ecchymosis or erythema  Noticeable round, soft tissue mass, ~5 cm diameter, located over adjacent just distal to medial joint line, over pes anserine bursa region   Mass does transiluminate with light.  Mass not tender to palpation. Soft, fluctuant.  Negative Tinel's over mass.  No other observable or palpable masses of the leg, foot, knee.    Squat: 100 %, with anterior knee pain.     ROM: 0 extension to 130 flexion (0 extension to 130 flexion on the left)  Tight hamstrings on straight leg raise.  Effusion: none  Tender: medial joint line   NTTP lat joint line, anterior or posterior " knee  McMurrays: positive - medially    MCL: stable, and non-painful at both 0 and 30 degrees knee flexion  Varus stress: stable, and non-painful at both 0 and 30 degrees knee flexion  Lachmans: neg, firm endpoint  Posterior Drawer stable  Patellofemoral joint:                Apprehension: negative              Crepitations: minimal    LEFT KNEE EXAM:    Skin: intact, no ecchymosis or erythema  Squat: 100 %, with anterior knee pain.     ROM: 0 extension to 130 flexion   Tight hamstrings on straight leg raise.  Effusion: none  Tender: NTTP med/lat joint line, anterior or posterior knee  McMurrays: negative    MCL: stable, and non-painful at both 0 and 30 degrees knee flexion  Varus stress: stable, and non-painful at both 0 and 30 degrees knee flexion  Lachmans: neg, firm endpoint  Posterior Drawer stable  Patellofemoral joint:                Apprehension: negative              Crepitations: minimal    X-rays:  3 views right knee from 11/10/2017 were reviewed in clinic today.  No obvious fractures or dislocations. Mild degenerative changes.    Assessment: Shaggy Pierce is a 57 year old male with right knee pain, likely medial meniscus tear and perimeniscal cyst.    Plan:  * Reviewed imaging with patient. Also, clinical exam findings.  * Discussed with patient that based on physical exam findings, it is not possible to completely rule out medial meniscus tear with an associated cyst.  * An MRI of the right knee was ordered for further evaluation.     * Rest  * Activity modification - avoid activities that aggravate symptoms.  * NSAIDS - regular use for inflammation, with food, as long as no contra-indications. Please discuss with pcp if needed.  * Ice twice daily to three times daily.  * Compression wrap as needed.  * Elevation of extremity to reduce swelling  * Tylenol as needed for pain    * After having the MRI, I would like the patient to call me so that we can discuss the results as well as how to proceed.        The information in this document, created by a scribe for me, accurately reflects the services I personally performed and the decisions made by me. I have reviewed and approved this document for accuracy.      Epifanio Manjarrez M.D., M.S.  Dept. of Orthopaedic Surgery  [facility]      Again, thank you for allowing me to participate in the care of your patient.        Sincerely,        Epifanio Manjarrez MD

## 2017-11-10 NOTE — PATIENT INSTRUCTIONS
Please remember to call and schedule a follow up appointment if one was recommended at your earliest convenience.  Orthopedics CLINIC HOURS TELEPHONE NUMBER   Dr. Loki Laurent  Certified Medical Assistant   Monday & Wednesday   8am - 5pm  Thursday 1pm - 5pm  Friday 8am -11:30am Specialty schedulers:   [phone] to schedule your surgery.  Main Clinic:   [phone] to make an appointment with any provider.    Urgent Care locations:    [facility] Monday-Friday Closed  Saturday-Sunday 9am-5pm      Monday-Friday 12pm - 8pm  Saturday-Sunday 9am-5pm [phone]     If SURGERY has been recommended, please call our Specialty Schedulers at [phone] to schedule your procedure.    If you need a medication refill, please contact your pharmacy. Please allow 3 business days for your refill to be completed.    If an MRI or CT scan has been recommended, please call [facility] Schedulers at [phone] to schedule your appointment.  Use [employer] (secure e-mail communication and access to your chart) to send a message or to make an appointment. Please ask how you can sign up for [employer].  Your care team's suggested websites for health information:   [email or web address] : Up to date and easily searchable information on multiple topics.   [email or web address] : [place] dept of heat, public health issues in [place], N1N1

## 2017-11-14 ENCOUNTER — RADIANT APPOINTMENT (OUTPATIENT)
Dept: MRI IMAGING | Facility: CLINIC | Age: 57
End: 2017-11-14
Attending: ORTHOPAEDIC SURGERY
Payer: COMMERCIAL

## 2017-11-14 DIAGNOSIS — R22.41 KNEE MASS, RIGHT: ICD-10-CM

## 2017-11-14 DIAGNOSIS — G89.29 CHRONIC PAIN OF RIGHT KNEE: ICD-10-CM

## 2017-11-14 DIAGNOSIS — M25.561 CHRONIC PAIN OF RIGHT KNEE: ICD-10-CM

## 2017-11-14 PROCEDURE — 73721 MRI JNT OF LWR EXTRE W/O DYE: CPT | Mod: TC

## 2017-11-15 ENCOUNTER — MYC MEDICAL ADVICE (OUTPATIENT)
Dept: ORTHOPEDICS | Facility: CLINIC | Age: 57
End: 2017-11-15

## 2017-12-01 NOTE — PATIENT INSTRUCTIONS
Before Your Surgery      Call your surgeon if there is any change in your health. This includes signs of a cold or flu (such as a sore throat, runny nose, cough, rash or fever).    Do not smoke, drink alcohol or take over the counter medicine (unless your surgeon or primary care doctor tells you to) for the 24 hours before and after surgery.    If you take prescribed drugs: Follow your doctor s orders about which medicines to take and which to stop until after surgery.    Eating and drinking prior to surgery: follow the instructions from your surgeon    Take a shower or bath the night before surgery. Use the soap your surgeon gave you to gently clean your skin. If you do not have soap from your surgeon, use your regular soap. Do not shave or scrub the surgery site.  Wear clean pajamas and have clean sheets on your bed.     [facility]    If you have any questions regarding to your visit please contact your care team:       Team Purple:   Clinic Hours Telephone Number   Dr. Jenae Ball   7am-7pm  Monday - Thursday   7am-5pm  Fridays  [phone]  (Appointment scheduling available 24/7)    Questions about your Visit?   Team Line:  [phone]   Urgent Care - [place] and [facility] - 11am-9pm Monday-Friday Saturday-Sunday- 9am-5pm   [facility] - 5pm-9pm Monday-Friday Saturday-Sunday- 9am-5pm  [phone] - [place]   [phone] - [facility]       What options do I have for visits at the clinic other than the traditional office visit?  To expand how we care for you, many of our providers are utilizing electronic visits (e-visits) and telephone visits, when medically appropriate, for interactions with their patients rather than a visit in the clinic.   We also offer nurse visits for many medical concerns. Just like any other service, we will bill your insurance company for this type of visit based on time spent on the phone  with your provider. Not all insurance companies cover these visits. Please check with your medical insurance if this type of visit is covered. You will be responsible for any charges that are not paid by your insurance.      E-visits via [employer]:  generally incur a $35.00 fee.  Telephone visits:  Time spent on the phone: *charged based on time that is spent on the phone in increments of 10 minutes. Estimated cost:   5-10 mins $30.00   11-20 mins. $59.00   21-30 mins. $85.00     Use [employer] (secure email communication and access to your chart) to send your primary care provider a message or make an appointment. Ask someone on your Team how to sign up for [employer].  For a Price Quote for your services, please call our Consumer Price Line at [phone].  As always, Thank you for trusting us with your health care needs!

## 2017-12-01 NOTE — PROGRESS NOTES
[facility]  [address] [place] [id number]  [phone]  Dept: [phone]    PRE-OP EVALUATION:  Today's date: 2017    Shaggy Pierec (: 1960) presents for pre-operative evaluation assessment as requested by Dr. Manjarrez.  He requires evaluation and anesthesia risk assessment prior to undergoing surgery/procedure for treatment of Torn meniscus and cyst on right medial knee .  Proposed procedure: Right knee arthroscopy, meniscal and chondral debridment.    Date of Surgery/ Procedure: 12/15/2017  Time of Surgery/ Procedure: 8:15am  Hospital/Surgical Facility: [place]  Fax number for surgical facility:   Primary Physician: Jenae Gray  Type of Anesthesia Anticipated: General    Patient has a Health Care Directive or Living Will:  NO    Preop Questions 2017   1.  Do you have a history of heart attack, stroke, stent, bypass or surgery on an artery in the head, neck, heart or legs? No   2.  Do you ever have any pain or discomfort in your chest? No   3.  Do you have a history of  Heart Failure? No   4.   Are you troubled by shortness of breath when:  walking on a level surface, or up a slight hill, or at night? No   5.  Do you currently have a cold, bronchitis or other respiratory infection? No   6.  Do you have a cough, shortness of breath, or wheezing? No   7.  Do you sometimes get pains in the calves of your legs when you walk? No   8. Do you or anyone in your family have previous history of blood clots? No   9.  Do you or does anyone in your family have a serious bleeding problem such as prolonged bleeding following surgeries or cuts? No   10. Have you ever had problems with anemia or been told to take iron pills? No   11. Have you had any abnormal blood loss such as black, tarry or bloody stools? No   12. Have you ever had a blood transfusion? No   13. Have you or any of your relatives ever had problems with anesthesia? No   14. Do you have sleep apnea,  excessive snoring or daytime drowsiness? No   15. Do you have any prosthetic heart valves? No   16. Do you have prosthetic joints? No           HPI:  Patient has right knee pain due to cyst on medial knee and a medial meniscal tear                                                       Brief HPI related to upcoming procedure: needs right knee arthroscopy for pain       HYPERLIPIDEMIA - Patient has a long history of significant Hyperlipidemia requiring medication for treatment with recent good control. Patient reports no problems or side effects with the medication.                                                                                                                                                       .  HYPOTHYROIDISM - Patient has a longstanding history of chronic Hypothyroidism. Patient has been doing well, noting no tremor, insomnia, hair loss or changes in skin texture. Last TSH value of 3.17. Continues to take medications as directed, without adverse reactions or side effects.                                                                                                                                                                                                                        .    MEDICAL HISTORY:                                                    Patient Active Problem List    Diagnosis Date Noted     Hyperlipidemia LDL goal <130 08/29/2017     Priority: Medium     History of colonic polyps 08/25/2017     Priority: Medium     Umbilical hernia without obstruction and without gangrene 08/25/2017     Priority: Medium     Hypothyroidism, unspecified type 06/27/2017     Priority: Medium     CARDIOVASCULAR SCREENING; LDL GOAL LESS THAN 160 10/27/2011     Priority: Medium     Nasal septal defect 10/27/2011     Priority: Medium      Past Medical History:   Diagnosis Date     Thyroid disease 1/1/2000     Past Surgical History:   Procedure Laterality Date     COLONOSCOPY  8/12     COSMETIC SURGERY   9/13     HERNIA REPAIR  3/11     SEPTOPLASTY  12/12     Current Outpatient Prescriptions   Medication Sig Dispense Refill     atorvastatin (LIPITOR) 20 MG tablet Take 1 tablet (20 mg) by mouth daily 90 tablet 4     levothyroxine (SYNTHROID/LEVOTHROID) 100 MCG tablet Take 1 tablet (100 mcg) by mouth daily 30 tablet 0     fluticasone (FLONASE) 50 MCG/ACT nasal spray Spray 2 sprays into both nostrils daily       OTC products: None, except as noted above    Allergies   Allergen Reactions     Aspirin [Bufferin Low Dose] Hives     Seasonal Allergies       Latex Allergy: NO    Social History   Substance Use Topics     Smoking status: Never Smoker     Smokeless tobacco: Former User     Quit date: 1/2/1990     Alcohol use No     History   Drug Use No     Immunization History   Administered Date(s) Administered     Influenza (H1N1) 10/20/2017     TDAP Vaccine (Adacel) 10/27/2011      REVIEW OF SYSTEMS:                                                    C: NEGATIVE for fever, chills, change in weight  I: NEGATIVE for worrisome rashes, moles or lesions  E: NEGATIVE for vision changes or irritation  E/M: NEGATIVE for ear, mouth and throat problems  R: NEGATIVE for significant cough or SOB  B: NEGATIVE for masses, tenderness or discharge  CV: NEGATIVE for chest pain, palpitations or peripheral edema  GI: NEGATIVE for nausea, abdominal pain, heartburn, or change in bowel habits  : NEGATIVE for frequency, dysuria, or hematuria  M: NEGATIVE for significant arthralgias or myalgia  N: NEGATIVE for weakness, dizziness or paresthesias  E: NEGATIVE for temperature intolerance, skin/hair changes  H: NEGATIVE for bleeding problems  P: NEGATIVE for changes in mood or affect    EXAM:                                                    /80  Pulse 90  Temp 97.3  F (36.3  C) (Oral)  Wt 227 lb 8 oz (103.2 kg)  SpO2 97%  BMI 34.59 kg/m2    GENERAL APPEARANCE: healthy, alert and no distress     EYES: EOMI,  PERRL     HENT: ear canals  and TM's normal and nose and mouth without ulcers or lesions     NECK: no adenopathy, no asymmetry, masses, or scars and thyroid normal to palpation     RESP: lungs clear to auscultation - no rales, rhonchi or wheezes     CV: regular rates and rhythm, normal S1 S2, no S3 or S4 and no murmur, click or rub     ABDOMEN:  soft, nontender, no HSM or masses and bowel sounds normal     MS: extremities normal- no gross deformities noted, no evidence of inflammation in joints, FROM in all extremities.     SKIN: no suspicious lesions or rashes     NEURO: Normal strength and tone, sensory exam grossly normal, mentation intact and speech normal     PSYCH: mentation appears normal. and affect normal/bright     LYMPHATICS: No axillary, cervical, or supraclavicular nodes    DIAGNOSTICS:                                                    EKG: appears normal, NSR, normal axis, normal intervals, no acute ST/T changes c/w ischemia, no LVH by voltage criteria    Recent Labs   Lab Test  03/01/11   1119   HGB  16.0   PLT  157        IMPRESSION:                                                    Reason for surgery/procedure: right knee pain due to meniscal tear   Diagnosis/reason for consult: need for EKG and hyperlipidemia     The proposed surgical procedure is considered LOW risk.    REVISED CARDIAC RISK INDEX  The patient has the following serious cardiovascular risks for perioperative complications such as (MI, PE, VFib and 3  AV Block):  No serious cardiac risks  INTERPRETATION: 0 risks: Class I (very low risk - 0.4% complication rate)    The patient has the following additional risks for perioperative complications:  No identified additional risks      ICD-10-CM    1. Preop general physical exam Z01.818 EKG 12-lead complete w/read - Clinics       RECOMMENDATIONS:                                                      --Consult hospital rounder / IM to assist post-op medical management    --Patient is to take all scheduled medications  on the day of surgery EXCEPT for modifications listed below.    APPROVAL GIVEN to proceed with proposed procedure, without further diagnostic evaluation       Signed Electronically by: FREDO SCHILLING MD    Copy of this evaluation report is provided to requesting physician.    [place] Preop Guidelines

## 2017-12-07 ENCOUNTER — OFFICE VISIT (OUTPATIENT)
Dept: FAMILY MEDICINE | Facility: CLINIC | Age: 57
End: 2017-12-07
Payer: COMMERCIAL

## 2017-12-07 VITALS
HEART RATE: 90 BPM | TEMPERATURE: 97.3 F | DIASTOLIC BLOOD PRESSURE: 80 MMHG | BODY MASS INDEX: 34.59 KG/M2 | OXYGEN SATURATION: 97 % | WEIGHT: 227.5 LBS | SYSTOLIC BLOOD PRESSURE: 126 MMHG

## 2017-12-07 DIAGNOSIS — Z01.818 PREOP GENERAL PHYSICAL EXAM: Primary | ICD-10-CM

## 2017-12-07 PROCEDURE — 99214 OFFICE O/P EST MOD 30 MIN: CPT | Performed by: FAMILY MEDICINE

## 2017-12-07 PROCEDURE — 93000 ELECTROCARDIOGRAM COMPLETE: CPT | Performed by: FAMILY MEDICINE

## 2017-12-07 NOTE — MR AVS SNAPSHOT
After Visit Summary   12/7/2017    Shaggy Pierce    MRN: [id number]           Patient Information     Date Of Birth          1960        Visit Information        Provider Department      12/7/2017 5:00 PM Jenae Gray MD [facility]        Today's Diagnoses     Preop general physical exam    -  1      Care Instructions      Before Your Surgery      Call your surgeon if there is any change in your health. This includes signs of a cold or flu (such as a sore throat, runny nose, cough, rash or fever).    Do not smoke, drink alcohol or take over the counter medicine (unless your surgeon or primary care doctor tells you to) for the 24 hours before and after surgery.    If you take prescribed drugs: Follow your doctor s orders about which medicines to take and which to stop until after surgery.    Eating and drinking prior to surgery: follow the instructions from your surgeon    Take a shower or bath the night before surgery. Use the soap your surgeon gave you to gently clean your skin. If you do not have soap from your surgeon, use your regular soap. Do not shave or scrub the surgery site.  Wear clean pajamas and have clean sheets on your bed.     [facility]    If you have any questions regarding to your visit please contact your care team:       Team Purple:   Clinic Hours Telephone Number   Dr. Jenae Ball   7am-7pm  Monday - Thursday   7am-5pm  Fridays  [phone]  (Appointment scheduling available 24/7)    Questions about your Visit?   Team Line:  [phone]   Urgent Care - [place] - 11am-9pm Monday-Friday Saturday-Sunday- 9am-5pm   [place] - 5pm-9pm Monday-Friday Saturday-Sunday- 9am-5pm  [phone] - [place]  [phone] - [place]       What options do I have for visits at the clinic other than the traditional office visit?  To expand how we care for you,  many of our providers are utilizing electronic visits (e-visits) and telephone visits, when medically appropriate, for interactions with their patients rather than a visit in the clinic.   We also offer nurse visits for many medical concerns. Just like any other service, we will bill your insurance company for this type of visit based on time spent on the phone with your provider. Not all insurance companies cover these visits. Please check with your medical insurance if this type of visit is covered. You will be responsible for any charges that are not paid by your insurance.      E-visits via AirDroidshart:  generally incur a $35.00 fee.  Telephone visits:  Time spent on the phone: *charged based on time that is spent on the phone in increments of 10 minutes. Estimated cost:   5-10 mins $30.00   11-20 mins. $59.00   21-30 mins. $85.00     Use TV Compass (secure email communication and access to your chart) to send your primary care provider a message or make an appointment. Ask someone on your Team how to sign up for TV Compass.  For a Price Quote for your services, please call our [place] Line at [phone].  As always, Thank you for trusting us with your health care needs!              Follow-ups after your visit        Your next 10 appointments already scheduled     Dec 15, 2017   Procedure with Epifanio Manjarrez MD   [facility] (--)    [address] [place] [address]   [phone]            Dec 27, 2017  9:00 AM CST   Return Visit with Epifanio Manjarrez MD   [facility] [place] ([facility] [place])    [address]  [place] [address] [phone]              Who to contact     If you have questions or need follow up information about today's clinic visit or your schedule please contact [facility] [place] directly at [phone].  Normal or non-critical lab and imaging results will be communicated to you by MyChart, letter or phone within 4 business  days after the clinic has received the results. If you do not hear from us within 7 days, please contact the clinic through Code71 or phone. If you have a critical or abnormal lab result, we will notify you by phone as soon as possible.  Submit refill requests through Code71 or call your pharmacy and they will forward the refill request to us. Please allow 3 business days for your refill to be completed.          Additional Information About Your Visit        [employer] Information     Code71 gives you secure access to your electronic health record. If you see a primary care provider, you can also send messages to your care team and make appointments. If you have questions, please call your primary care clinic.  If you do not have a primary care provider, please call [phone] and they will assist you.        Care EveryWhere ID     This is your Care EveryWhere ID. This could be used by other organizations to access your [place] medical records  [id number]        Your Vitals Were     Pulse Temperature Pulse Oximetry BMI (Body Mass Index)          90 97.3  F (36.3  C) (Oral) 97% 34.59 kg/m2         Blood Pressure from Last 3 Encounters:   12/07/17 126/80   11/10/17 (!) 157/92   08/25/17 (!) 140/98    Weight from Last 3 Encounters:   12/07/17 227 lb 8 oz (103.2 kg)   11/10/17 226 lb 12.8 oz (102.9 kg)   08/25/17 217 lb (98.4 kg)              We Performed the Following     EKG 12-lead complete w/read - Clinics        Primary Care Provider Office Phone # Fax #    Jenae Gray [phone]       [address] [address]        Equal Access to Services     BRIA TAYLOR : Hadii sherita Flores, lindada lurichardadaha, qaybta kaalmatnaner hamilton. So [facility] [phone].    ATENCIÓN: Si habla español, tiene a silverman disposición servicios gratuitos de asistencia lingüística. Llame al [phone].    We comply with  applicable federal civil rights laws and [place] laws. We do not discriminate on the basis of race, color, national origin, age, disability, sex, sexual orientation, or gender identity.            Thank you!     Thank you for choosing [facility]  for your care. Our goal is always to provide you with excellent care. Hearing back from our patients is one way we can continue to improve our services. Please take a few minutes to complete the written survey that you may receive in the mail after your visit with us. Thank you!             Your Updated Medication List - Protect others around you: Learn how to safely use, store and throw away your medicines at www.disposemymeds.org.          This list is accurate as of: 12/7/17  5:34 PM.  Always use your most recent med list.                   Brand Name Dispense Instructions for use Diagnosis    atorvastatin 20 MG tablet    LIPITOR    90 tablet    Take 1 tablet (20 mg) by mouth daily    Hyperlipidemia LDL goal <130       FLONASE 50 MCG/ACT spray   Generic drug:  fluticasone      Spray 2 sprays into both nostrils daily        levothyroxine 100 MCG tablet    SYNTHROID/LEVOTHROID    30 tablet    Take 1 tablet (100 mcg) by mouth daily    Hypothyroidism, unspecified type

## 2017-12-07 NOTE — NURSING NOTE
"Chief Complaint   Patient presents with     Pre-Op Exam     Health Maintenance     Colon Cancer Screening       Initial /80  Pulse 90  Temp 97.3  F (36.3  C) (Oral)  Wt 227 lb 8 oz (103.2 kg)  SpO2 97%  BMI 34.59 kg/m2 Estimated body mass index is 34.59 kg/(m^2) as calculated from the following:    Height as of 11/10/17: 5' 8\" (1.727 m).    Weight as of this encounter: 227 lb 8 oz (103.2 kg).  Medication Reconciliation: complete     Marti Ho MA    "

## 2017-12-09 ENCOUNTER — ANESTHESIA EVENT (OUTPATIENT)
Dept: SURGERY | Facility: AMBULATORY SURGERY CENTER | Age: 57
End: 2017-12-09

## 2017-12-15 ENCOUNTER — ANESTHESIA (OUTPATIENT)
Dept: SURGERY | Facility: AMBULATORY SURGERY CENTER | Age: 57
End: 2017-12-15
Payer: COMMERCIAL

## 2017-12-15 ENCOUNTER — HOSPITAL ENCOUNTER (OUTPATIENT)
Facility: AMBULATORY SURGERY CENTER | Age: 57
Discharge: HOME | End: 2017-12-15
Attending: ORTHOPAEDIC SURGERY | Admitting: ORTHOPAEDIC SURGERY
Payer: COMMERCIAL

## 2017-12-15 VITALS
OXYGEN SATURATION: 92 % | DIASTOLIC BLOOD PRESSURE: 96 MMHG | TEMPERATURE: 97.4 F | SYSTOLIC BLOOD PRESSURE: 132 MMHG | RESPIRATION RATE: 14 BRPM

## 2017-12-15 DIAGNOSIS — S83.241D TEAR OF MEDIAL MENISCUS OF RIGHT KNEE, CURRENT, SUBSEQUENT ENCOUNTER: Primary | ICD-10-CM

## 2017-12-15 PROCEDURE — G8907 PT DOC NO EVENTS ON DISCHARG: HCPCS

## 2017-12-15 PROCEDURE — 27347 REMOVE KNEE CYST: CPT | Mod: 59 | Performed by: ORTHOPAEDIC SURGERY

## 2017-12-15 PROCEDURE — 29881 ARTHRS KNE SRG MNISECTMY M/L: CPT | Mod: RT

## 2017-12-15 PROCEDURE — 88304 TISSUE EXAM BY PATHOLOGIST: CPT | Performed by: ORTHOPAEDIC SURGERY

## 2017-12-15 PROCEDURE — 29881 ARTHRS KNE SRG MNISECTMY M/L: CPT | Mod: RT | Performed by: ORTHOPAEDIC SURGERY

## 2017-12-15 PROCEDURE — 27347 REMOVE KNEE CYST: CPT | Mod: XS,RT

## 2017-12-15 PROCEDURE — G8916 PT W IV AB GIVEN ON TIME: HCPCS

## 2017-12-15 RX ORDER — HYDROCODONE BITARTRATE AND ACETAMINOPHEN 5; 325 MG/1; MG/1
2 TABLET ORAL
Status: DISCONTINUED | OUTPATIENT
Start: 2017-12-15 | End: 2017-12-16 | Stop reason: HOSPADM

## 2017-12-15 RX ORDER — ONDANSETRON 2 MG/ML
4 INJECTION INTRAMUSCULAR; INTRAVENOUS EVERY 30 MIN PRN
Status: DISCONTINUED | OUTPATIENT
Start: 2017-12-15 | End: 2017-12-16 | Stop reason: HOSPADM

## 2017-12-15 RX ORDER — SODIUM CHLORIDE, SODIUM LACTATE, POTASSIUM CHLORIDE, CALCIUM CHLORIDE 600; 310; 30; 20 MG/100ML; MG/100ML; MG/100ML; MG/100ML
INJECTION, SOLUTION INTRAVENOUS CONTINUOUS
Status: DISCONTINUED | OUTPATIENT
Start: 2017-12-15 | End: 2017-12-16 | Stop reason: HOSPADM

## 2017-12-15 RX ORDER — HYDROCODONE BITARTRATE AND ACETAMINOPHEN 5; 325 MG/1; MG/1
1-2 TABLET ORAL EVERY 4 HOURS PRN
Qty: 30 TABLET | Refills: 0 | Status: SHIPPED | OUTPATIENT
Start: 2017-12-15 | End: 2018-07-23

## 2017-12-15 RX ORDER — CEFAZOLIN SODIUM 1 G/3ML
1 INJECTION, POWDER, FOR SOLUTION INTRAMUSCULAR; INTRAVENOUS SEE ADMIN INSTRUCTIONS
Status: DISCONTINUED | OUTPATIENT
Start: 2017-12-15 | End: 2017-12-16 | Stop reason: HOSPADM

## 2017-12-15 RX ORDER — FENTANYL CITRATE 50 UG/ML
INJECTION, SOLUTION INTRAMUSCULAR; INTRAVENOUS PRN
Status: DISCONTINUED | OUTPATIENT
Start: 2017-12-15 | End: 2017-12-15

## 2017-12-15 RX ORDER — LIDOCAINE HYDROCHLORIDE 20 MG/ML
INJECTION, SOLUTION INFILTRATION; PERINEURAL PRN
Status: DISCONTINUED | OUTPATIENT
Start: 2017-12-15 | End: 2017-12-15

## 2017-12-15 RX ORDER — FENTANYL CITRATE 50 UG/ML
25-50 INJECTION, SOLUTION INTRAMUSCULAR; INTRAVENOUS
Status: DISCONTINUED | OUTPATIENT
Start: 2017-12-15 | End: 2017-12-16 | Stop reason: HOSPADM

## 2017-12-15 RX ORDER — DEXAMETHASONE SODIUM PHOSPHATE 4 MG/ML
INJECTION, SOLUTION INTRA-ARTICULAR; INTRALESIONAL; INTRAMUSCULAR; INTRAVENOUS; SOFT TISSUE PRN
Status: DISCONTINUED | OUTPATIENT
Start: 2017-12-15 | End: 2017-12-15

## 2017-12-15 RX ORDER — CEFAZOLIN SODIUM 2 G/100ML
2 INJECTION, SOLUTION INTRAVENOUS
Status: COMPLETED | OUTPATIENT
Start: 2017-12-15 | End: 2017-12-15

## 2017-12-15 RX ORDER — ONDANSETRON 4 MG/1
4 TABLET, ORALLY DISINTEGRATING ORAL EVERY 30 MIN PRN
Status: DISCONTINUED | OUTPATIENT
Start: 2017-12-15 | End: 2017-12-16 | Stop reason: HOSPADM

## 2017-12-15 RX ORDER — AMOXICILLIN 250 MG
1-2 CAPSULE ORAL 2 TIMES DAILY
Qty: 60 TABLET | Refills: 0 | Status: SHIPPED | OUTPATIENT
Start: 2017-12-15 | End: 2020-11-02

## 2017-12-15 RX ORDER — GABAPENTIN 300 MG/1
300 CAPSULE ORAL ONCE
Status: COMPLETED | OUTPATIENT
Start: 2017-12-15 | End: 2017-12-15

## 2017-12-15 RX ORDER — METHOCARBAMOL 750 MG/1
750 TABLET, FILM COATED ORAL
Status: DISCONTINUED | OUTPATIENT
Start: 2017-12-15 | End: 2017-12-16 | Stop reason: HOSPADM

## 2017-12-15 RX ORDER — HYDROXYZINE HYDROCHLORIDE 25 MG/1
25 TABLET, FILM COATED ORAL
Status: DISCONTINUED | OUTPATIENT
Start: 2017-12-15 | End: 2017-12-16 | Stop reason: HOSPADM

## 2017-12-15 RX ORDER — ONDANSETRON 2 MG/ML
INJECTION INTRAMUSCULAR; INTRAVENOUS PRN
Status: DISCONTINUED | OUTPATIENT
Start: 2017-12-15 | End: 2017-12-15

## 2017-12-15 RX ORDER — ACETAMINOPHEN 325 MG/1
975 TABLET ORAL ONCE
Status: COMPLETED | OUTPATIENT
Start: 2017-12-15 | End: 2017-12-15

## 2017-12-15 RX ORDER — PROPOFOL 10 MG/ML
INJECTION, EMULSION INTRAVENOUS PRN
Status: DISCONTINUED | OUTPATIENT
Start: 2017-12-15 | End: 2017-12-15

## 2017-12-15 RX ORDER — BUPIVACAINE HYDROCHLORIDE 2.5 MG/ML
INJECTION, SOLUTION INFILTRATION; PERINEURAL PRN
Status: DISCONTINUED | OUTPATIENT
Start: 2017-12-15 | End: 2017-12-15 | Stop reason: HOSPADM

## 2017-12-15 RX ORDER — GLYCOPYRROLATE 0.2 MG/ML
INJECTION, SOLUTION INTRAMUSCULAR; INTRAVENOUS PRN
Status: DISCONTINUED | OUTPATIENT
Start: 2017-12-15 | End: 2017-12-15

## 2017-12-15 RX ORDER — ONDANSETRON 4 MG/1
4 TABLET, ORALLY DISINTEGRATING ORAL
Status: DISCONTINUED | OUTPATIENT
Start: 2017-12-15 | End: 2017-12-16 | Stop reason: HOSPADM

## 2017-12-15 RX ORDER — HYDROMORPHONE HYDROCHLORIDE 1 MG/ML
.3-.5 INJECTION, SOLUTION INTRAMUSCULAR; INTRAVENOUS; SUBCUTANEOUS EVERY 10 MIN PRN
Status: DISCONTINUED | OUTPATIENT
Start: 2017-12-15 | End: 2017-12-16 | Stop reason: HOSPADM

## 2017-12-15 RX ORDER — NALOXONE HYDROCHLORIDE 0.4 MG/ML
.1-.4 INJECTION, SOLUTION INTRAMUSCULAR; INTRAVENOUS; SUBCUTANEOUS
Status: DISCONTINUED | OUTPATIENT
Start: 2017-12-15 | End: 2017-12-16 | Stop reason: HOSPADM

## 2017-12-15 RX ORDER — KETOROLAC TROMETHAMINE 30 MG/ML
INJECTION, SOLUTION INTRAMUSCULAR; INTRAVENOUS PRN
Status: DISCONTINUED | OUTPATIENT
Start: 2017-12-15 | End: 2017-12-15

## 2017-12-15 RX ORDER — MEPERIDINE HYDROCHLORIDE 25 MG/ML
12.5 INJECTION INTRAMUSCULAR; INTRAVENOUS; SUBCUTANEOUS
Status: DISCONTINUED | OUTPATIENT
Start: 2017-12-15 | End: 2017-12-16 | Stop reason: HOSPADM

## 2017-12-15 RX ADMIN — ONDANSETRON 4 MG: 2 INJECTION INTRAMUSCULAR; INTRAVENOUS at 08:54

## 2017-12-15 RX ADMIN — LIDOCAINE HYDROCHLORIDE 40 MG: 20 INJECTION, SOLUTION INFILTRATION; PERINEURAL at 08:16

## 2017-12-15 RX ADMIN — FENTANYL CITRATE 50 MCG: 50 INJECTION, SOLUTION INTRAMUSCULAR; INTRAVENOUS at 08:12

## 2017-12-15 RX ADMIN — SODIUM CHLORIDE, SODIUM LACTATE, POTASSIUM CHLORIDE, CALCIUM CHLORIDE: 600; 310; 30; 20 INJECTION, SOLUTION INTRAVENOUS at 07:43

## 2017-12-15 RX ADMIN — SODIUM CHLORIDE, SODIUM LACTATE, POTASSIUM CHLORIDE, CALCIUM CHLORIDE: 600; 310; 30; 20 INJECTION, SOLUTION INTRAVENOUS at 09:03

## 2017-12-15 RX ADMIN — FENTANYL CITRATE 25 MCG: 50 INJECTION, SOLUTION INTRAMUSCULAR; INTRAVENOUS at 08:36

## 2017-12-15 RX ADMIN — Medication 0.5 MG: at 08:57

## 2017-12-15 RX ADMIN — CEFAZOLIN SODIUM 2 G: 2 INJECTION, SOLUTION INTRAVENOUS at 08:19

## 2017-12-15 RX ADMIN — KETOROLAC TROMETHAMINE 30 MG: 30 INJECTION, SOLUTION INTRAMUSCULAR; INTRAVENOUS at 09:03

## 2017-12-15 RX ADMIN — ACETAMINOPHEN 975 MG: 325 TABLET ORAL at 07:33

## 2017-12-15 RX ADMIN — GLYCOPYRROLATE 0.1 MG: 0.2 INJECTION, SOLUTION INTRAMUSCULAR; INTRAVENOUS at 08:22

## 2017-12-15 RX ADMIN — GLYCOPYRROLATE 0.1 MG: 0.2 INJECTION, SOLUTION INTRAMUSCULAR; INTRAVENOUS at 08:13

## 2017-12-15 RX ADMIN — FENTANYL CITRATE 25 MCG: 50 INJECTION, SOLUTION INTRAMUSCULAR; INTRAVENOUS at 08:26

## 2017-12-15 RX ADMIN — PROPOFOL 200 MG: 10 INJECTION, EMULSION INTRAVENOUS at 08:16

## 2017-12-15 RX ADMIN — DEXAMETHASONE SODIUM PHOSPHATE 4 MG: 4 INJECTION, SOLUTION INTRA-ARTICULAR; INTRALESIONAL; INTRAMUSCULAR; INTRAVENOUS; SOFT TISSUE at 08:25

## 2017-12-15 RX ADMIN — Medication 0.5 MG: at 08:42

## 2017-12-15 RX ADMIN — Medication 0.5 MG: at 08:46

## 2017-12-15 RX ADMIN — GABAPENTIN 300 MG: 300 CAPSULE ORAL at 07:33

## 2017-12-15 NOTE — IP AVS SNAPSHOT
MRN:[id number]                      After Visit Summary   12/15/2017    Shaggy Pierce    MRN: [id number]           Thank you!     Thank you for choosing [facility] for your care. Our goal is always to provide you with excellent care. Hearing back from our patients is one way we can continue to improve our services. Please take a few minutes to complete the written survey that you may receive in the mail after you visit with us. Thank you!        Patient Information     Date Of Birth          1960        About your hospital stay     You were admitted on:  December 15, 2017 You last received care in the:  [facility]    You were discharged on:  December 15, 2017       Who to Call     For medical emergencies, please call 911.  For non-urgent questions about your medical care, please call your primary care provider or clinic, [phone]  For questions related to your surgery, please call your surgery clinic        Attending Provider     Provider Epifanio Varela MD Orthopedics       Primary Care Provider Office Phone # Fax #    Jenae Gray [phone]      After Care Instructions      Diet as Tolerated       Return to diet before surgery, unless instructed otherwise.            Discharge Instructions       Review outpatient procedure discharge instructions with patient as directed by Provider            Dressing Change       Change dressing on third day after surgery. Cover with compression wrap            Ice to affected area       Ice pack to surgical site every 15 minutes per hour for 24 hours            No Alcohol       For 24 hours post procedure            No driving or operating machinery        Until off pain medications, seen in followup            Notify Provider       For signs and symptoms of infection: Fever greater than 101.5, redness, swelling, heat at site, drainage, pus.            Return to clinic       2 weeks after date of  surgery in Dr. Manjarrez's clinic for suture removal and wound check. Please call for appointment, [phone]. Office locations include [facility], [facility] and [facility].            Shower        Ok to remove dressing and shower on postoperative day #3. Leave steri strips in place. Dab dry. Cover with gauze. No soaking baths, hot tubs, whirlpools, swimming pools.            Weight bearing - As tolerated                 Your next 10 appointments already scheduled     Dec 27, 2017  9:00 AM CST   Return Visit with [facility]rez MD   [facility]    [address] [place] [address] [phone]              Further instructions from your care team       1. Name: Shaggy Pierce MRN #: [id number]  2. Date: 12/15/2017  Procedure: right knee arthroscopy, meniscal debridement, cyst excision  3. Discharge to home when stable, tolerating clear liquids, and patient has urinated  4. Call for follow-up appointment, [phone], with Dr. Manjarrez in: 2 weeks.   WOUND CARE    The bandage may be slightly bloody. This is normal.  5. Ice:  Keep an ice bag on your knee for 20 minutes at a time.  6. Keep incisions clean and dry following surgery for:  72 hours   7. Change all bandages in:  72 hours       8. If bandages are changed before follow-up, cover all incisions with fresh bandages or bandaids.  9. O.K. to shower (may get incision wet) in:  72 hours  10. No tub baths, swimming pools, hot tubs, etc. for a minimum of 2 weeks following surgery  ACTIVITY  11. Keep leg elevated on a pillow placed under ankle. Do not keep pillow under your knee.  12. Weight-bearing ([place]):  Weight-bear as tolerated       May discontinue crutches in 2-3 days if able to walk without a limp.  13. Bracing: no brace needed.  14. Range of motion limits: no limit. Work on regaining full range of motion.  15. Exercises:  Perform exercises 3 times a day for a minimum of 25  reps each time (start today or tomorrow):             Quadriceps sets  Calf Pumps Straight leg raises  Heels Slides   16. Start Physical Therapy: upon return to clinic   17. OK to drive:  Not until off pain medications, seen in followup    When going back to driving, be sure to test braking/acceleration maneuvers in an empty parking lot before entry into any traffic areas.      ABSOLUTELY NO DRIVING WHILE TAKING NARCOTICS!    DISCHARGE MEDICATIONS:   Norco (5/325), 1 to 2 tablets, take every 4 to 6 hours as needed for pain, do not exceed 12 tabs/day  Other: stool softeners while on pain medications.    Strong pain medication has been prescribed. Use as directed. Do not combine with alcohol. Be careful as you walk or climb stairs.   DIET:  If no nausea, clear liquids should be taken initially.  Then progress to solid foods when clear liquids are tolerated.   RESPONSE TO SURGERY: It is normal to have pain and swelling in your knee after surgery. It may take 4 weeks or longer for the swelling to go away. It is also common to notice some bruising around the knee, thigh, and calf as the swelling resolves.  EMERGENCY: Call or return for any fevers (temperature greater than 101.5   or sustained fevers greater than 100.5   that haven t resolved within 3 to 4 days following surgery) or chills, increasing pain, swelling, redness, calf pain, drainage (especially if yellow, green, or foul smelling), excessive bleeding), chest pain, shortness of breath:  Phone #: [phone]; If emergency, go to local ER or dial 911.    Epifanio Manjarrez M.D., M.S.  Dept. of Orthopaedic Surgery  [facility]    12/15/2017        KNEE SURGERY - HOME EXERCISE PROGRAM    All exercises to be performed at least 3 times per day.     Quad Sets    Sit with leg extended    Tighten quad muscles in front of leg, trying to  push back of knee downward    Hold exercise for 10 seconds    Rest 10 seconds between reps    Perform 1 set of 20 reps, 3  times a day     Heel Slides     Lie on back with legs straight    Slide heel to buttocks     Return to start position    Repeat with other leg    Perform 1 rep every 4 seconds    Perform 3 sets of 20 reps, 3 times a day    Rest 1 minute between sets     Ankle Pumps     Lie on back with foot elevated on pillow    Move foot up and down, pumping ankle    Perform 3 sets of 20 reps, 3 times a day    Perform 1 rep every 4 seconds    Rest 1 minute between sets     Straight Leg Raise    Lie on back with uninvolved knee bent    Raise straight leg to thigh level of bend leg    Return to starting position    Perform 3 sets of 20 reps, 3 times a day    Perform 1 rep every 4 seconds    Rest 1 minute between sets            [facility]  Same-Day Surgery   Orders & Instructions for Your Child    For 24 to 48 hours after surgery:    Your child should get plenty of rest.  Avoid strenuous play.  Offer reading, coloring and other light activities.   Your child may go back to a regular diet.  Offer light meals at first.   If your child has nausea (feels sick to the stomach) or vomiting (throws up):  Offer clear liquids such as apple juice, flat soda pop, Jell-O, Popsicles, Gatorade and clear soups.  Be sure your child drinks enough fluids.  Move to a normal diet as your child is able.   Your child may feel dizzy or sleepy.  He or she should avoid activities that required balance (riding a bike or skateboard, climbing stairs, skating).  A slight fever is normal.  Call the doctor if the fever is over 100 F (37.7 C) (taken under the tongue) or lasts longer than 24 hours.  Your child may have a dry mouth, sore throat, muscle aches or nightmares.  These should go away within 24 hours.  A responsible adult must stay with the child.  All caregivers should get a copy of these instructions.  Do not make important or legal decisions.   Call your doctor for any of the followin.  Signs of infection (fever, growing  tenderness at the surgery site, a large amount of drainage or bleeding, severe pain, foul-smelling drainage, redness, swelling).    2. It has been over 8 to 10 hours since surgery and your child is still not able to urinate (pass water) or is complaining about not being able to urinate.    To contact a doctor call:  [phone]    Tylenol was given at 7:30am.        Pending Results     No orders found from 12/13/2017 to 12/16/2017.            Admission Information     Date & Time Provider Department Dept. Phone    12/15/2017 Epifanio Manjarrez MD [facility] [phone]      Your Vitals Were     Blood Pressure Temperature Respirations Pulse Oximetry          122/93 97.7  F (36.5  C) (Temporal) 12 94%        MyChart Information     wutabouthart gives you secure access to your electronic health record. If you see a primary care provider, you can also send messages to your care team and make appointments. If you have questions, please call your primary care clinic.  If you do not have a primary care provider, please call [phone] and they will assist you.        Care EveryWhere ID     This is your Care EveryWhere ID. This could be used by other organizations to access your [place] medical records  [id number]        Equal Access to Services     BRIA TAYLOR : Hadii aad ku hadasho Sodomenicoali, waaxda luqadaha, qaybta kaalmada adeegyada, tanner vazquez. So [facility] [phone].    ATENCIÓN: Si habla español, tiene a silverman disposición servicios gratuitos de asistencia lingüística. Llame al [phone].    We comply with applicable federal civil rights laws and [place] laws. We do not discriminate on the basis of race, color, national origin, age, disability, sex, sexual orientation, or gender identity.               Review of your medicines      START taking        Dose / Directions    HYDROcodone-acetaminophen 5-325 MG per tablet   Commonly known as:  NORCO   Used for:  Tear of  medial meniscus of right knee, current, subsequent encounter        Dose:  1-2 tablet   Take 1-2 tablets by mouth every 4 hours as needed for other (Moderate to Severe Pain)   Quantity:  30 tablet   Refills:  0       senna-docusate 8.6-50 MG per tablet   Commonly known as:  SENOKOT-S;PERICOLACE   Used for:  Tear of medial meniscus of right knee, current, subsequent encounter        Dose:  1-2 tablet   Take 1-2 tablets by mouth 2 times daily Take while on oral narcotics to prevent or treat constipation.   Quantity:  60 tablet   Refills:  0         CONTINUE these medicines which have NOT CHANGED        Dose / Directions    atorvastatin 20 MG tablet   Commonly known as:  LIPITOR   Used for:  Hyperlipidemia LDL goal <130        Dose:  20 mg   Take 1 tablet (20 mg) by mouth daily   Quantity:  90 tablet   Refills:  4       FLONASE 50 MCG/ACT spray   Generic drug:  fluticasone        Dose:  2 spray   Spray 2 sprays into both nostrils daily   Refills:  0       levothyroxine 100 MCG tablet   Commonly known as:  SYNTHROID/LEVOTHROID   Used for:  Hypothyroidism, unspecified type        Dose:  100 mcg   Take 1 tablet (100 mcg) by mouth daily   Quantity:  30 tablet   Refills:  0            Where to get your medicines      These medications were sent to [facility] [address], [address]  [address], [address], [facility] [address]     Phone:  [phone]     senna-docusate 8.6-50 MG per tablet         Some of these will need a paper prescription and others can be bought over the counter. Ask your nurse if you have questions.     Bring a paper prescription for each of these medications     HYDROcodone-acetaminophen 5-325 MG per tablet                Protect others around you: Learn how to safely use, store and throw away your medicines at www.disposemymeds.org.             Medication List: This is a list of all your medications and when to take them. Check marks below indicate  your daily home schedule. Keep this list as a reference.      Medications           Morning Afternoon Evening Bedtime As Needed    atorvastatin 20 MG tablet   Commonly known as:  LIPITOR   Take 1 tablet (20 mg) by mouth daily                                FLONASE 50 MCG/ACT spray   Spray 2 sprays into both nostrils daily   Generic drug:  fluticasone                                HYDROcodone-acetaminophen 5-325 MG per tablet   Commonly known as:  NORCO   Take 1-2 tablets by mouth every 4 hours as needed for other (Moderate to Severe Pain)                                levothyroxine 100 MCG tablet   Commonly known as:  SYNTHROID/LEVOTHROID   Take 1 tablet (100 mcg) by mouth daily                                senna-docusate 8.6-50 MG per tablet   Commonly known as:  SENOKOT-S;PERICOLACE   Take 1-2 tablets by mouth 2 times daily Take while on oral narcotics to prevent or treat constipation.

## 2017-12-15 NOTE — DISCHARGE INSTRUCTIONS
1. Name: Shaggy Pierce MRN #: [id number]  2. Date: 12/15/2017  Procedure: right knee arthroscopy, meniscal debridement, cyst excision  3. Discharge to home when stable, tolerating clear liquids, and patient has urinated  4. Call for follow-up appointment, [phone], with Dr. Manjarrez in: 2 weeks.   WOUND CARE    The bandage may be slightly bloody. This is normal.  5. Ice:  Keep an ice bag on your knee for 20 minutes at a time.  6. Keep incisions clean and dry following surgery for:  72 hours   7. Change all bandages in:  72 hours       8. If bandages are changed before follow-up, cover all incisions with fresh bandages or bandaids.  9. O.K. to shower (may get incision wet) in:  72 hours  10. No tub baths, swimming pools, hot tubs, etc. for a minimum of 2 weeks following surgery  ACTIVITY  11. Keep leg elevated on a pillow placed under ankle. Do not keep pillow under your knee.  12. Weight-bearing ([place]):  Weight-bear as tolerated       May discontinue crutches in 2-3 days if able to walk without a limp.  13. Bracing: no brace needed.  14. Range of motion limits: no limit. Work on regaining full range of motion.  15. Exercises:  Perform exercises 3 times a day for a minimum of 25 reps each time (start today or tomorrow):             Quadriceps sets  Calf Pumps Straight leg raises  Heels Slides   16. Start Physical Therapy: upon return to clinic   17. OK to drive:  Not until off pain medications, seen in followup    When going back to driving, be sure to test braking/acceleration maneuvers in an empty parking lot before entry into any traffic areas.      ABSOLUTELY NO DRIVING WHILE TAKING NARCOTICS!    DISCHARGE MEDICATIONS:   Norco (5/325), 1 to 2 tablets, take every 4 to 6 hours as needed for pain, do not exceed 12 tabs/day  Other: stool softeners while on pain medications.    Strong pain medication has been prescribed. Use as directed. Do not combine with alcohol. Be careful as you walk or climb stairs.   DIET:   If no nausea, clear liquids should be taken initially.  Then progress to solid foods when clear liquids are tolerated.   RESPONSE TO SURGERY: It is normal to have pain and swelling in your knee after surgery. It may take 4 weeks or longer for the swelling to go away. It is also common to notice some bruising around the knee, thigh, and calf as the swelling resolves.  EMERGENCY: Call or return for any fevers (temperature greater than 101.5   or sustained fevers greater than 100.5   that haven t resolved within 3 to 4 days following surgery) or chills, increasing pain, swelling, redness, calf pain, drainage (especially if yellow, green, or foul smelling), excessive bleeding), chest pain, shortness of breath:  Phone #: [phone]; If emergency, go to local ER or dial 911.    Epifanio Manjarrez M.D., M.S.  Dept. of Orthopaedic Surgery  [facility]    12/15/2017        KNEE SURGERY - HOME EXERCISE PROGRAM    All exercises to be performed at least 3 times per day.     Quad Sets    Sit with leg extended    Tighten quad muscles in front of leg, trying to  push back of knee downward    Hold exercise for 10 seconds    Rest 10 seconds between reps    Perform 1 set of 20 reps, 3 times a day     Heel Slides     Lie on back with legs straight    Slide heel to buttocks     Return to start position    Repeat with other leg    Perform 1 rep every 4 seconds    Perform 3 sets of 20 reps, 3 times a day    Rest 1 minute between sets     Ankle Pumps     Lie on back with foot elevated on pillow    Move foot up and down, pumping ankle    Perform 3 sets of 20 reps, 3 times a day    Perform 1 rep every 4 seconds    Rest 1 minute between sets     Straight Leg Raise    Lie on back with uninvolved knee bent    Raise straight leg to thigh level of bend leg    Return to starting position    Perform 3 sets of 20 reps, 3 times a day    Perform 1 rep every 4 seconds    Rest 1 minute between sets            [facility]  Same-Day Surgery   Orders & Instructions for Your Child    For 24 to 48 hours after surgery:    Your child should get plenty of rest.  Avoid strenuous play.  Offer reading, coloring and other light activities.   Your child may go back to a regular diet.  Offer light meals at first.   If your child has nausea (feels sick to the stomach) or vomiting (throws up):  Offer clear liquids such as apple juice, flat soda pop, Jell-O, Popsicles, Gatorade and clear soups.  Be sure your child drinks enough fluids.  Move to a normal diet as your child is able.   Your child may feel dizzy or sleepy.  He or she should avoid activities that required balance (riding a bike or skateboard, climbing stairs, skating).  A slight fever is normal.  Call the doctor if the fever is over 100 F (37.7 C) (taken under the tongue) or lasts longer than 24 hours.  Your child may have a dry mouth, sore throat, muscle aches or nightmares.  These should go away within 24 hours.  A responsible adult must stay with the child.  All caregivers should get a copy of these instructions.  Do not make important or legal decisions.   Call your doctor for any of the followin.  Signs of infection (fever, growing tenderness at the surgery site, a large amount of drainage or bleeding, severe pain, foul-smelling drainage, redness, swelling).    2. It has been over 8 to 10 hours since surgery and your child is still not able to urinate (pass water) or is complaining about not being able to urinate.    To contact a doctor call:  [phone]    Tylenol was given at 7:30am.

## 2017-12-15 NOTE — ANESTHESIA CARE TRANSFER NOTE
Patient: Shaggy Pierce    Procedure(s):  Right knee arthroscopy, medial meniscal  debridement. open pes cyst excision - Wound Class: I-Clean    Diagnosis: Right knee medial meniscus ter, pes anserine bursitis  Diagnosis Additional Information: No value filed.    Anesthesia Type:   General, LMA     Note:  Airway :Face Mask  Patient transferred to:PACU  Handoff Report: Identifed the Patient, Identified the Reponsible Provider, Reviewed the pertinent medical history, Discussed the surgical course, Reviewed Intra-OP anesthesia mangement and issues during anesthesia, Set expectations for post-procedure period and Allowed opportunity for questions and acknowledgement of understanding      Vitals: (Last set prior to Anesthesia Care Transfer)    CRNA VITALS  12/15/2017 0851 - 12/15/2017 0925      12/15/2017             SpO2: 97 %                Electronically Signed By: CHRISTY Robbins CRNA  December 15, 2017  9:25 AM

## 2017-12-15 NOTE — BRIEF OP NOTE
POST OPERATIVE NOTE-IMMEDIATE :    Date of surgery: 12/15/2017    Preoperative Diagnosis:  Right knee medial meniscus ter, pes anserine bursitis    Postoperative Diagnosis:  right knee medial meniscus tear, pes anserine ganglion cyst    Procedures:  Procedure(s):  ARTHROSCOPY KNEE, right  Medial meniscus debridement  Shaving chondroplasty  Anterior fat pad shaving excision  Pes anserine bursa lobulated ganglion cyst excision    Prosthetic Devices: See Op Note    Surgeon(s) and Assistants (if any):  Attending Surgeon: Epifanio Manjarrez MD, MS  Assistant: Javier Sandoval PA-C    Anesthesia:  General    Antibiotics: 2g Ancef    IV Fluids: 1 liter    UOP: 0, no long    Drains: none    Specimens: pes anserine bursa ganglion cyst sent to pathology    Complications: None apparent.    Tourniquet Time: 30 minutes @ 300mmHg    Findings/Conclusions: see procedure above, See Op Note for further detail.    Estimated Blood Loss: 10ml    Post Op Plan:  *Rest   *Ice   *Elevation   *Weight bearing as tolerated, crutches as needed   *oral pain medications  *Daily asa x2 weeks  *Home exercise program   *Return to clinic 2 weeks for wound check, suture removal, sooner if needed      Javier Sandoval PA-C, CAQ (Ortho)  Supervising Physician: Epifanio Manjarrez M.D., M.S.  Dept. of Orthopaedic Surgery  [facility]

## 2017-12-15 NOTE — INTERVAL H&P NOTE
The History and Physical on patient's chart was personally reviewed today with the patient. there have been no interval changes in patient's history since H+P performed.    History:    Shaggy Pierce is a 57 year old male who presents for evaluation and management of a right knee mass. The mass as been noticed for 4 months, since 7/2017. No known injury.  Prior to mass, he had soreness in the knee, medially. Possible injury in Winter 2016 after slipping while getting off the train. Reports previously biking 3-4 miles a day. Did not wear compression socks as he usually did pervious summers, and feels that this may have contributed to the mass formation. Today his pain is severe, rated a 7/10. Pain is located over the medial/inferior knee. Pain is worst over the top of the mass Prolonged standing, up and down stairs. He does notice some popping in the knee. He reports having mild pain/discomfort around the mass site. He denies associated numbness or tingling. He denies any associated injuries or punctures to his leg in the area of the mass. Denies locking, catching or giving way.    3 views right knee from 11/10/2017 were reviewed in clinic today.  No obvious fractures or dislocations. Mild degenerative changes.    1. Moderate-sized tear of the posterior horn of the medial meniscus.  No parameniscal cyst is seen.  2. Prominent pes anserine bursitis.    There is a prominent lobulated  pes anserine bursa. This measures approximately 4.0 cm craniocaudal x  4.6 cm AP x 1.6 cm transverse. No semimembranosus-tibial collateral  ligament bursitis. No other soft tissue pathology is seen.      56yo male with right knee pain, medial meniscus tear, pes bursal / perimeniscal cyst, mild osteoarthritis.      Patient elects to proceed with planned procedure. Right knee arthroscopy, meniscal and chondral debridement, pes bursal cyst excision. Outpatient.      Risks and perceived benefits of surgery again discussed with patient.  Patient's questions addressed and answered. Written informed consent obtained and reviewed. Surgical site marked with indelible marker with patient's participation after confirming site with patient.      Epifanio Manjarrez M.D., M.S.  Dept. of Orthopaedic Surgery  [facility]

## 2017-12-15 NOTE — ANESTHESIA POSTPROCEDURE EVALUATION
Patient: Shaggy Pierce    Procedure(s):  Right knee arthroscopy, medial meniscal  debridement. open pes cyst excision - Wound Class: I-Clean    Diagnosis:Right knee medial meniscus ter, pes anserine bursitis  Diagnosis Additional Information: No value filed.    Anesthesia Type:  General, LMA    Note:  Anesthesia Post Evaluation    Patient location during evaluation: PACU  Patient participation: Able to fully participate in evaluation  Level of consciousness: awake and alert  Pain management: adequate  Airway patency: patent  Cardiovascular status: acceptable  Respiratory status: acceptable  Hydration status: acceptable  PONV: none     Anesthetic complications: None          Last vitals:  Vitals:    12/15/17 0930 12/15/17 0935 12/15/17 0950   BP: (!) 122/93 (!) 129/92 (!) 132/96   Resp: 12 14 14   Temp:   97.4  F (36.3  C)   SpO2: 94% 96% 92%         Electronically Signed By: Sumit Perkins MD  December 15, 2017

## 2017-12-15 NOTE — OP NOTE
DATE OF PROCEDURE:   12/15/2017      PREOPERATIVE DIAGNOSIS:  Right knee complex medial meniscus tear.  Right knee pes anserine bursal ganglion cyst.      POSTOPERATIVE DIAGNOSIS:  Right knee complex medial meniscus tear.  Right knee pes anserine bursal ganglion cyst, in addition to some medial femoral condyle chondromalacia.      PROCEDURE:   1.  Right knee arthroscopic partial medial meniscectomy.   2.  Right knee arthroscopic shaving chondroplasty of the medial femoral condyle.   3.  Right knee open pes anserine ganglion cyst excision.      ATTENDING SURGEON:  Epifanio Manjarrez MD      ASSISTANT:  Javier marshall PA-C      ANESTHESIA:  General, in the supine position.      INTRAVENOUS FLUID:  1200 mL lactated Ringer's.      URINE OUTPUT:  Zero, no Vega.      ESTIMATED BLOOD LOSS:  10 mL.      ANTIBIOTICS:  Cefazolin 2 grams IV prior to incision.      TOURNIQUET:  Inflation.      TOURNIQUET TIME:  30 minutes at 300 mmHg of the right upper thigh.      COMPLICATNS:  None apparent.      SPECIMENS:  Lobulated cystic mass from pes anserine bursa.      IMPLANTS:  None.      DRAINS:  None.      FINDINGS:  Lobulated cystic mass in the subcutaneous tissue over the pes anserine bursa, filled with thick gelatinous fluid.  Intraarticularly mild synovitis.  No effusion.  Intact patellofemoral articular surfaces.  Intact ACL within the notch.  Lateral compartment with grade I chondrosis intact lateral meniscus.  Medial compartment with grade II chondrosis of the medial femoral condyle.  Complex tear of the posterior horn medial meniscus with displaced undersurface flaps.      INDICATIONS FOR PROCEDURE:  Shaggy Pierce is a pleasant 57-year-old gentleman with ongoing right knee pain and subcutaneous mass.  Knee pain since possibly an injury in the winter of 2016.  He reports biking 3-4 miles a day, but has not been able to due to more progressive medial pain and the onset of this mass.  The mass has been present from this past July.   Pain is mostly the medial and inferior to the knee over the area of the mass.  Denies locking, catching or giving way.  He states the mass fluctuates in size.  Seen in the clinic.  X-rays show some mild degenerative changes, a mass over the PEs anserine area, likely a cyst.  We referred him for an MRI showing complex tear of the medial meniscus as well as a lobulated cystic mass over the pes anserine and some mild chondromalacia.  We discussed treatment options, both nonsurgical and surgical.  As a quality of life decision, he elected to proceed with surgery.      DETAILS OF PROCEDURE:  The patient was identified in the preoperative holding area.  After confirming with the patient correct procedure and procedural site, the right knee was marked with an indelible marker by myself, the attending surgeon.  After again reviewing the risks and perceived benefits of surgery, questions were addressed, he elected to proceed.  Written informed consent was obtained and reviewed.      The patient was then taken to the operating room, placed supine on the operating table.  All bony prominences well padded and he was secured.  After adequate general anesthesia, a nonsterile tourniquet was placed to the right upper thigh.  Right lower extremity prepped and draped in the usual sterile fashion.      A time-out was then performed confirming the correct patient, procedure, procedure site, availability of instruments and implants counts, review of the patient's allergies as well as administration of prophylactic antibiotics by operating staff.      The right lower extremity was elevated, exsanguinated with an Esmarch and tourniquet inflated.  I proceeded to make about a 2 cm longitudinal incision centered over the lobulated mass over the pes anserine, sharply through skin, and bluntly dissected through the soft tissues and subcutaneous tissues.  The cystic mass was noted deep.  This was then bluntly dissected out and then sharply  excised from the pes anserine area.  Part of it was decompressed upon dissection.  Cystic mass was removed.  I then proceeded to use my dissecting scissors and dissected both anteriorly as well as posteriorly in the area of the mass, and debrided any inflammatory or mass-like tissue.  It appeared to be that the entire mass was removed and/or decompressed.  The wound was then irrigated.      I then proceeded to the arthroscopic portion of the case.  Standard anterolateral arthroscopy portal was made.  Arthroscope was introduced.  Upon entering suprapatellar pouch, there was no effusion.  Mild synovitis.  No loose bodies.  Small medial plica.  Upon entering the notch, the ACL was intact.  Anterior medial arthroscopy portal was made.  A probe was introduced.  The knee was placed in a figure 4 position, evaluating the lateral compartment.  Probing superior and inferior surface of the lateral meniscus revealed no obvious tear.  Some grade I chondrosis.  Proceeded then to the medial compartment.  I could see tearing of the posterior horn.  Upon probing this, this was complex with undersurface and horizontal cleavage type tears, with some displaced undersurface flap tears.  Alternating between an oscillating debrider and meniscal biter, this tear was debrided back until this was stable, confirmed with probing.  Grade II chondrosis of the medial femoral condyle and using the oscillating debrider, performed a gentle shaving chondroplasty of that.      Then, presented back up to suprapatellar pouch.  Small medial plica was resected.  Final diagnostic examination of the knee was performed.  No remnant loose bodies.  Remaining fluid was drained from the knee.  Instruments were removed.  All wounds were closed with 3-0 Prolene.  Injected with 0.25% Marcaine.  Steri-Strips, well padded soft dressing, Ace wrap.  Tourniquet deflated.  He was then awakened and taken to recovery in stable condition.      Postoperatively, rest, ice,  elevate.  Weightbear as tolerated.  Home exercise program.  Oral pain medications will include hydrocodone.  Will see him back in 2 weeks' time for wound check, suture removal and review of pathology.      No apparent complications.         MINH HERNÁNDEZ MD             D: 12/15/2017 09:21   T: 12/15/2017 10:26   MT: [id number]      Name:     ALISSA NOVAK   MRN:      [id number]        Account:        [id number]   :      1960           Procedure Date: 12/15/2017      Document: [id number]

## 2017-12-15 NOTE — IP AVS SNAPSHOT
[facility]    [address] [place] [address]    Phone:  [phone]                                       After Visit Summary   12/15/2017    Shaggy Pierce    MRN: [id number]           After Visit Summary Signature Page     I have received my discharge instructions, and my questions have been answered. I have discussed any challenges I see with this plan with the nurse or doctor.    ..........................................................................................................................................  Patient/Patient Representative Signature      ..........................................................................................................................................  Patient Representative Print Name and Relationship to Patient    ..................................................               ................................................  Date                                            Time    ..........................................................................................................................................  Reviewed by Signature/Title    ...................................................              ..............................................  Date                                                            Time

## 2017-12-15 NOTE — ANESTHESIA PREPROCEDURE EVALUATION
Physical Exam  Normal systems: cardiovascular and pulmonary    Airway   Mallampati: II  TM distance: >3 FB  Neck ROM: full    Dental     Cardiovascular   Rhythm and rate: regular and normal      Pulmonary    breath sounds clear to auscultation                    Anesthesia Plan      History & Physical Review  History and physical reviewed and following examination; no interval change.    ASA Status:  2 .    NPO Status:  > 6 hours    Plan for General and LMA with Intravenous and Propofol induction. Maintenance will be Balanced.    PONV prophylaxis:  Ondansetron (or other 5HT-3) and Dexamethasone or Solumedrol       Postoperative Care  Postoperative pain management:  Oral pain medications, IV analgesics and Multi-modal analgesia.      Consents  Anesthetic plan, risks, benefits and alternatives discussed with:  Patient..                  ANESTHESIA PREOP EVALUATION    PROCEDURE: Procedure(s):  Right knee arthroscopy, meniscal and chondral debridement.  Possible pes anserine bursa excision/debridement - Wound Class:     HPI: Shaggy Pierce is a 57 year old male who presents for above procedure.    PAST MEDICAL HISTORY:    Past Medical History:   Diagnosis Date     Thyroid disease 1/1/2000       PAST SURGICAL HISTORY:    Past Surgical History:   Procedure Laterality Date     COLONOSCOPY  8/12     COSMETIC SURGERY  9/13     HERNIA REPAIR  3/11     SEPTOPLASTY  12/12       PAST ANESTHESIA HISTORY:     No personal or family h/o anesthesia problems    SOCIAL HISTORY:       Social History   Substance Use Topics     Smoking status: Never Smoker     Smokeless tobacco: Former User     Quit date: 1/2/1990     Alcohol use No       ALLERGIES:     Allergies   Allergen Reactions     Aspirin [Bufferin Low Dose] Hives     Seasonal Allergies        MEDICATIONS:       (Not in a hospital admission)    Current Outpatient Prescriptions   Medication Sig Dispense Refill     atorvastatin (LIPITOR) 20 MG tablet Take 1 tablet (20 mg) by  mouth daily 90 tablet 4     levothyroxine (SYNTHROID/LEVOTHROID) 100 MCG tablet Take 1 tablet (100 mcg) by mouth daily 30 tablet 0     fluticasone (FLONASE) 50 MCG/ACT nasal spray Spray 2 sprays into both nostrils daily         Current Outpatient Prescriptions Ordered in [facility]   Medication Sig Dispense Refill     atorvastatin (LIPITOR) 20 MG tablet Take 1 tablet (20 mg) by mouth daily 90 tablet 4     levothyroxine (SYNTHROID/LEVOTHROID) 100 MCG tablet Take 1 tablet (100 mcg) by mouth daily 30 tablet 0     fluticasone (FLONASE) 50 MCG/ACT nasal spray Spray 2 sprays into both nostrils daily       No current [facility]-ordered facility-administered medications on file.        PHYSICAL EXAM:    Vitals: T Data Unavailable, P Data Unavailable, BP Data Unavailable, R Data Unavailable, SpO2  , Weight Wt Readings from Last 2 Encounters:   12/07/17 103.2 kg (227 lb 8 oz)   11/10/17 102.9 kg (226 lb 12.8 oz)       See doc flowsheet      No Data Recorded    NPO STATUS: see doc flowsheet    LABS:    BMP:  Recent Labs   Lab Test  08/25/17   1055   GLC  90       LFTs:   No results for input(s): PROTTOTAL, ALBUMIN, BILITOTAL, ALKPHOS, AST, ALT, BILIDIRECT in the last 43852 hours.    CBC:   Recent Labs   Lab Test  03/01/11   1119   WBC  7.6   RBC  5.10   HGB  16.0   HCT  46.1   MCV  90   MCH  31.4   MCHC  34.7   RDW  12.5   PLT  157       Coags:  No results for input(s): INR, PTT, FIBR in the last 71972 hours.    Imaging:  No orders to display       Sumit Perkins MD  Anesthesiology Staff  Pager [phone]    12/14/2017  9:58 PM            .

## 2017-12-15 NOTE — H&P (VIEW-ONLY)
[facility]  [address] [place] [id number]  [phone]  Dept: [phone]    PRE-OP EVALUATION:  Today's date: 2017    Shaggy Pierce (: 1960) presents for pre-operative evaluation assessment as requested by Dr. Manjarrez.  He requires evaluation and anesthesia risk assessment prior to undergoing surgery/procedure for treatment of Torn meniscus and cyst on right medial knee .  Proposed procedure: Right knee arthroscopy, meniscal and chondral debridment.    Date of Surgery/ Procedure: 12/15/2017  Time of Surgery/ Procedure: 8:15am  Hospital/Surgical Facility: [facility]  Fax number for surgical facility:   Primary Physician: Jenae Gray  Type of Anesthesia Anticipated: General    Patient has a Health Care Directive or Living Will:  NO    Preop Questions 2017   1.  Do you have a history of heart attack, stroke, stent, bypass or surgery on an artery in the head, neck, heart or legs? No   2.  Do you ever have any pain or discomfort in your chest? No   3.  Do you have a history of  Heart Failure? No   4.   Are you troubled by shortness of breath when:  walking on a level surface, or up a slight hill, or at night? No   5.  Do you currently have a cold, bronchitis or other respiratory infection? No   6.  Do you have a cough, shortness of breath, or wheezing? No   7.  Do you sometimes get pains in the calves of your legs when you walk? No   8. Do you or anyone in your family have previous history of blood clots? No   9.  Do you or does anyone in your family have a serious bleeding problem such as prolonged bleeding following surgeries or cuts? No   10. Have you ever had problems with anemia or been told to take iron pills? No   11. Have you had any abnormal blood loss such as black, tarry or bloody stools? No   12. Have you ever had a blood transfusion? No   13. Have you or any of your relatives ever had problems with anesthesia? No   14. Do you have sleep apnea,  excessive snoring or daytime drowsiness? No   15. Do you have any prosthetic heart valves? No   16. Do you have prosthetic joints? No           HPI:  Patient has right knee pain due to cyst on medial knee and a medial meniscal tear                                                       Brief HPI related to upcoming procedure: needs right knee arthroscopy for pain       HYPERLIPIDEMIA - Patient has a long history of significant Hyperlipidemia requiring medication for treatment with recent good control. Patient reports no problems or side effects with the medication.                                                                                                                                                       .  HYPOTHYROIDISM - Patient has a longstanding history of chronic Hypothyroidism. Patient has been doing well, noting no tremor, insomnia, hair loss or changes in skin texture. Last TSH value of 3.17. Continues to take medications as directed, without adverse reactions or side effects.                                                                                                                                                                                                                        .    MEDICAL HISTORY:                                                    Patient Active Problem List    Diagnosis Date Noted     Hyperlipidemia LDL goal <130 08/29/2017     Priority: Medium     History of colonic polyps 08/25/2017     Priority: Medium     Umbilical hernia without obstruction and without gangrene 08/25/2017     Priority: Medium     Hypothyroidism, unspecified type 06/27/2017     Priority: Medium     CARDIOVASCULAR SCREENING; LDL GOAL LESS THAN 160 10/27/2011     Priority: Medium     Nasal septal defect 10/27/2011     Priority: Medium      Past Medical History:   Diagnosis Date     Thyroid disease 1/1/2000     Past Surgical History:   Procedure Laterality Date     COLONOSCOPY  8/12     COSMETIC SURGERY   9/13     HERNIA REPAIR  3/11     SEPTOPLASTY  12/12     Current Outpatient Prescriptions   Medication Sig Dispense Refill     atorvastatin (LIPITOR) 20 MG tablet Take 1 tablet (20 mg) by mouth daily 90 tablet 4     levothyroxine (SYNTHROID/LEVOTHROID) 100 MCG tablet Take 1 tablet (100 mcg) by mouth daily 30 tablet 0     fluticasone (FLONASE) 50 MCG/ACT nasal spray Spray 2 sprays into both nostrils daily       OTC products: None, except as noted above    Allergies   Allergen Reactions     Aspirin [Bufferin Low Dose] Hives     Seasonal Allergies       Latex Allergy: NO    Social History   Substance Use Topics     Smoking status: Never Smoker     Smokeless tobacco: Former User     Quit date: 1/2/1990     Alcohol use No     History   Drug Use No     Immunization History   Administered Date(s) Administered     Influenza (H1N1) 10/20/2017     TDAP Vaccine (Adacel) 10/27/2011      REVIEW OF SYSTEMS:                                                    C: NEGATIVE for fever, chills, change in weight  I: NEGATIVE for worrisome rashes, moles or lesions  E: NEGATIVE for vision changes or irritation  E/M: NEGATIVE for ear, mouth and throat problems  R: NEGATIVE for significant cough or SOB  B: NEGATIVE for masses, tenderness or discharge  CV: NEGATIVE for chest pain, palpitations or peripheral edema  GI: NEGATIVE for nausea, abdominal pain, heartburn, or change in bowel habits  : NEGATIVE for frequency, dysuria, or hematuria  M: NEGATIVE for significant arthralgias or myalgia  N: NEGATIVE for weakness, dizziness or paresthesias  E: NEGATIVE for temperature intolerance, skin/hair changes  H: NEGATIVE for bleeding problems  P: NEGATIVE for changes in mood or affect    EXAM:                                                    /80  Pulse 90  Temp 97.3  F (36.3  C) (Oral)  Wt 227 lb 8 oz (103.2 kg)  SpO2 97%  BMI 34.59 kg/m2    GENERAL APPEARANCE: healthy, alert and no distress     EYES: EOMI,  PERRL     HENT: ear canals  and TM's normal and nose and mouth without ulcers or lesions     NECK: no adenopathy, no asymmetry, masses, or scars and thyroid normal to palpation     RESP: lungs clear to auscultation - no rales, rhonchi or wheezes     CV: regular rates and rhythm, normal S1 S2, no S3 or S4 and no murmur, click or rub     ABDOMEN:  soft, nontender, no HSM or masses and bowel sounds normal     MS: extremities normal- no gross deformities noted, no evidence of inflammation in joints, FROM in all extremities.     SKIN: no suspicious lesions or rashes     NEURO: Normal strength and tone, sensory exam grossly normal, mentation intact and speech normal     PSYCH: mentation appears normal. and affect normal/bright     LYMPHATICS: No axillary, cervical, or supraclavicular nodes    DIAGNOSTICS:                                                    EKG: appears normal, NSR, normal axis, normal intervals, no acute ST/T changes c/w ischemia, no LVH by voltage criteria    Recent Labs   Lab Test  03/01/11   1119   HGB  16.0   PLT  157        IMPRESSION:                                                    Reason for surgery/procedure: right knee pain due to meniscal tear   Diagnosis/reason for consult: need for EKG and hyperlipidemia     The proposed surgical procedure is considered LOW risk.    REVISED CARDIAC RISK INDEX  The patient has the following serious cardiovascular risks for perioperative complications such as (MI, PE, VFib and 3  AV Block):  No serious cardiac risks  INTERPRETATION: 0 risks: Class I (very low risk - 0.4% complication rate)    The patient has the following additional risks for perioperative complications:  No identified additional risks      ICD-10-CM    1. Preop general physical exam Z01.818 EKG 12-lead complete w/read - Clinics       RECOMMENDATIONS:                                                      --Consult hospital rounder / IM to assist post-op medical management    --Patient is to take all scheduled medications  on the day of surgery EXCEPT for modifications listed below.    APPROVAL GIVEN to proceed with proposed procedure, without further diagnostic evaluation       Signed Electronically by: FREDO SCHILLING MD    Copy of this evaluation report is provided to requesting physician.    [facility] Preop Guidelines

## 2017-12-19 LAB — COPATH REPORT: NORMAL

## 2017-12-27 ENCOUNTER — OFFICE VISIT (OUTPATIENT)
Dept: ORTHOPEDICS | Facility: CLINIC | Age: 57
End: 2017-12-27
Payer: COMMERCIAL

## 2017-12-27 VITALS — RESPIRATION RATE: 16 BRPM | BODY MASS INDEX: 34.4 KG/M2 | WEIGHT: 227 LBS | HEIGHT: 68 IN

## 2017-12-27 DIAGNOSIS — Z98.890 S/P ARTHROSCOPIC SURGERY OF RIGHT KNEE: Primary | ICD-10-CM

## 2017-12-27 PROCEDURE — 99024 POSTOP FOLLOW-UP VISIT: CPT | Performed by: ORTHOPAEDIC SURGERY

## 2017-12-27 ASSESSMENT — PAIN SCALES - GENERAL: PAINLEVEL: MODERATE PAIN (4)

## 2017-12-27 NOTE — NURSING NOTE
"Chief Complaint   Patient presents with     Surgical Followup     Right knee arthroscopy, mm debridement, open pes bursa ganglion cyst excision. DOS: 12/15/17. Doing well.        Initial Resp 16  Ht 1.727 m (5' 8\")  Wt 103 kg (227 lb)  BMI 34.52 kg/m2 Estimated body mass index is 34.52 kg/(m^2) as calculated from the following:    Height as of this encounter: 1.727 m (5' 8\").    Weight as of this encounter: 103 kg (227 lb).  Medication Reconciliation: complete   Javier Sandoval PA-C, CAQ (Ortho)  Supervising Physician: Epifanio Manjarrez M.D., M.S.  Dept. of Orthopaedic Surgery  [facility]      "

## 2017-12-27 NOTE — PROGRESS NOTES
Chief Complaint   Patient presents with     Surgical Followup     Right knee arthroscopy, mm debridement, open pes bursa ganglion cyst excision. DOS: 12/15/17.        SURGERY: right knee arthroscopy. ( [facility] )  1.  Right knee arthroscopic partial medial meniscectomy.   2.  Right knee arthroscopic shaving chondroplasty of the medial femoral condyle.   3.  Right knee open pes anserine ganglion cyst excision.   DATE OF SURGERY: 12/15/2017.      HISTORY OF PRESENT ILLNESS:  Shaggy Pierce is a 57 year old male seen for postoperative evaluation of a right knee arthroscopy and partial medial meniscectomy, shaving chondroplasty and pes anserine ganglion cyst excision for right knee complex medial meniscus tear. Right knee pes anserine bursal ganglion cyst, in addition to some medial femoral condyle chondromalacia. Surgery occurred 2 weeks ago. Returns today stating he is doing well. Pain has been moderate, rated a 4/10. He still has some pain, but is manageable. Does not feel his knee is back to 100%. No problems with the surgical wounds. Denies fevers chills or night sweats. No associated numbness or tingling. Has been taking it easy since surgery as recommended. Taking aspirin daily.        OR FINDINGS:  Lobulated cystic mass in the subcutaneous tissue over the pes anserine bursa, filled with thick gelatinous fluid.  Intraarticularly mild synovitis.  No effusion.  Intact patellofemoral articular surfaces.  Intact ACL within the notch.  Lateral compartment with grade I chondrosis intact lateral meniscus.  Medial compartment with grade II chondrosis of the medial femoral condyle.  Complex tear of the posterior horn medial meniscus with displaced undersurface flaps.     Past Medical History:   Diagnosis Date     Thyroid disease 1/1/2000       Past Surgical History:   Procedure Laterality Date     ARTHROSCOPY KNEE Right 12/15/2017    Procedure: ARTHROSCOPY KNEE;  Right knee arthroscopy, medial meniscal   "debridement. open pes cyst excision;  Surgeon: Epifanio Manjarrez MD;  Location: [facility] OR     COLONOSCOPY  8/12     COSMETIC SURGERY  9/13     HERNIA REPAIR  3/11     SEPTOPLASTY  12/12       Medications:   Current Outpatient Prescriptions:      senna-docusate (SENOKOT-S;PERICOLACE) 8.6-50 MG per tablet, Take 1-2 tablets by mouth 2 times daily Take while on oral narcotics to prevent or treat constipation., Disp: 60 tablet, Rfl: 0     HYDROcodone-acetaminophen (NORCO) 5-325 MG per tablet, Take 1-2 tablets by mouth every 4 hours as needed for other (Moderate to Severe Pain), Disp: 30 tablet, Rfl: 0     atorvastatin (LIPITOR) 20 MG tablet, Take 1 tablet (20 mg) by mouth daily, Disp: 90 tablet, Rfl: 4     levothyroxine (SYNTHROID/LEVOTHROID) 100 MCG tablet, Take 1 tablet (100 mcg) by mouth daily, Disp: 30 tablet, Rfl: 0     fluticasone (FLONASE) 50 MCG/ACT nasal spray, Spray 2 sprays into both nostrils daily, Disp: , Rfl:     Allergies:   Allergies   Allergen Reactions     Aspirin [Bufferin Low Dose] Hives     Seasonal Allergies        REVIEW OF SYSTEMS:   CONSTITUTIONAL:NEGATIVE for fever, chills, night sweats  INTEGUMENTARY/SKIN: NEGATIVE for worrisome wound problems or redness.  MUSCULOSKELETAL:See HPI above  NEURO: NEGATIVE for weakness, dizziness or paresthesias    This document serves as a record of the services and decisions personally performed and made by Epifanio Manjarrez MD. It was created on his behalf by Gena Ryan, a trained medical scribe. The creation of this document is based the provider's statements to the medical scribe.    Scribe Gena Ryan 9:08 AM 12/27/2017     PHYSICAL EXAM:  Resp 16  Ht 5' 8\" (1.727 m)  Wt 227 lb (103 kg)  BMI 34.52 kg/m2   GENERAL APPEARANCE: healthy, alert, no distress  SKIN: no suspicious lesions or rashes  NEURO: Normal strength and tone, mentation intact and speech normal  PSYCH:  mentation appears normal and affect normal, not anxious  RESPIRATORY: No increased work of " breathing.    RIGHT  LOWER EXTREMITY:  Gait: subtle quadriceps avoidance right.  No Quad atrophy, strength normal.  Intact sensation deep peroneal nerve, superficial peroneal nerve, med/lat tibial nerve, sural nerve, saphenous nerve  Intact EHL, EDL, TA, FHL, GS, quadriceps hamstrings and hip flexors  Toes warm and well perfused, brisk capillary refill. Palpable 2+ dp pulses.  calf soft and nttp or squeeze.  Edema: none    RIGHT  KNEE EXAM:    Skin: intact, no ecchymosis or erythema  Incisions: skin edges well approximated, sutures in place. Dry. No erythema.  ROM: 0 extension to 125 flexion  Effusion: small to moderate  Tender: NTTP med/lat joint line, anterior or posterior knee           X-RAY: none indicated.      Impression: Shaggy Pierce is a 57 year old male 2 weeks status post right knee arthroscopy and partial medial meniscectomy, shaving chondroplasty and pes anserine ganglion cyst excision , doing well.       Plan: routine postoperative knee arthroscopy  * suture removal  * reviewed arthroscopy photos    * WB status: Cautioned not to overdo it too quickly. Increased activities too soon will lead to more swelling of the knee and leg, more pain, slower recovery. Expect 4-6 weeks. Take it easy returning to biking, slow and gradual.    * Rest  * Activity modification - avoid activities that aggravate symptoms.  * NSAIDS - regular use for inflammation, with food, as long as no contra-indications. Please discuss with pcp if needed.  * Ice twice daily to three times daily as needed.  * Compression wrap as needed.  * Elevation of extremity to reduce swelling as needed.  * home exercise program for strengthening, stretching and range of motion exercises, effusion control.  * Tylenol as needed for pain  * return to clinic as needed.      * We did also discuss that based on the amount of arthritic changes seen at the time of surgery, may have continued knee pain due to the arthritis. Once recovered from the knee  arthroscopy, and arthritic symptoms persist, consider full treatment of arthritis starting with Physical Therapy and injections, NSAIDS, activity modification, bracing.    The information in this document, created by a scribe for me, accurately reflects the services I personally performed and the decisions made by me. I have reviewed and approved this document for accuracy.        Epifanio Manjarrez M.D., M.S.  Dept. of Orthopaedic Surgery  [facility]

## 2017-12-27 NOTE — LETTER
12/27/2017         RE: Shaggy Pierce  [address]  [place] [address]        Dear Colleague,    Thank you for referring your patient, Shaggy Pierce, to the [facility]. Please see a copy of my visit note below.    Chief Complaint   Patient presents with     Surgical Followup     Right knee arthroscopy, mm debridement, open pes bursa ganglion cyst excision. DOS: 12/15/17.        SURGERY: right knee arthroscopy. ( [facility] )  1.  Right knee arthroscopic partial medial meniscectomy.   2.  Right knee arthroscopic shaving chondroplasty of the medial femoral condyle.   3.  Right knee open pes anserine ganglion cyst excision.   DATE OF SURGERY: 12/15/2017.      HISTORY OF PRESENT ILLNESS:  Shaggy Pierce is a 57 year old male seen for postoperative evaluation of a right knee arthroscopy and partial medial meniscectomy, shaving chondroplasty and pes anserine ganglion cyst excision for right knee complex medial meniscus tear. Right knee pes anserine bursal ganglion cyst, in addition to some medial femoral condyle chondromalacia. Surgery occurred 2 weeks ago. Returns today stating he is doing well. Pain has been moderate, rated a 4/10. He still has some pain, but is manageable. Does not feel his knee is back to 100%. No problems with the surgical wounds. Denies fevers chills or night sweats. No associated numbness or tingling. Has been taking it easy since surgery as recommended. Taking aspirin daily.        OR FINDINGS:  Lobulated cystic mass in the subcutaneous tissue over the pes anserine bursa, filled with thick gelatinous fluid.  Intraarticularly mild synovitis.  No effusion.  Intact patellofemoral articular surfaces.  Intact ACL within the notch.  Lateral compartment with grade I chondrosis intact lateral meniscus.  Medial compartment with grade II chondrosis of the medial femoral condyle.  Complex tear of the posterior horn medial meniscus with displaced undersurface flaps.  "    Past Medical History:   Diagnosis Date     Thyroid disease 1/1/2000       Past Surgical History:   Procedure Laterality Date     ARTHROSCOPY KNEE Right 12/15/2017    Procedure: ARTHROSCOPY KNEE;  Right knee arthroscopy, medial meniscal  debridement. open pes cyst excision;  Surgeon: Epifanio Manjarrez MD;  Location: [facility] OR     COLONOSCOPY  8/12     COSMETIC SURGERY  9/13     HERNIA REPAIR  3/11     SEPTOPLASTY  12/12       Medications:   Current Outpatient Prescriptions:      senna-docusate (SENOKOT-S;PERICOLACE) 8.6-50 MG per tablet, Take 1-2 tablets by mouth 2 times daily Take while on oral narcotics to prevent or treat constipation., Disp: 60 tablet, Rfl: 0     HYDROcodone-acetaminophen (NORCO) 5-325 MG per tablet, Take 1-2 tablets by mouth every 4 hours as needed for other (Moderate to Severe Pain), Disp: 30 tablet, Rfl: 0     atorvastatin (LIPITOR) 20 MG tablet, Take 1 tablet (20 mg) by mouth daily, Disp: 90 tablet, Rfl: 4     levothyroxine (SYNTHROID/LEVOTHROID) 100 MCG tablet, Take 1 tablet (100 mcg) by mouth daily, Disp: 30 tablet, Rfl: 0     fluticasone (FLONASE) 50 MCG/ACT nasal spray, Spray 2 sprays into both nostrils daily, Disp: , Rfl:     Allergies:   Allergies   Allergen Reactions     Aspirin [Bufferin Low Dose] Hives     Seasonal Allergies        REVIEW OF SYSTEMS:   CONSTITUTIONAL:NEGATIVE for fever, chills, night sweats  INTEGUMENTARY/SKIN: NEGATIVE for worrisome wound problems or redness.  MUSCULOSKELETAL:See HPI above  NEURO: NEGATIVE for weakness, dizziness or paresthesias    This document serves as a record of the services and decisions personally performed and made by Epifanio Manjarrez MD. It was created on his behalf by Gena Ryan, a trained medical scribe. The creation of this document is based the provider's statements to the medical scribe.    Scribe Gena Ryan 9:08 AM 12/27/2017     PHYSICAL EXAM:  Resp 16  Ht 5' 8\" (1.727 m)  Wt 227 lb (103 kg)  BMI 34.52 kg/m2   GENERAL APPEARANCE: " healthy, alert, no distress  SKIN: no suspicious lesions or rashes  NEURO: Normal strength and tone, mentation intact and speech normal  PSYCH:  mentation appears normal and affect normal, not anxious  RESPIRATORY: No increased work of breathing.    RIGHT  LOWER EXTREMITY:  Gait: subtle quadriceps avoidance right.  No Quad atrophy, strength normal.  Intact sensation deep peroneal nerve, superficial peroneal nerve, med/lat tibial nerve, sural nerve, saphenous nerve  Intact EHL, EDL, TA, FHL, GS, quadriceps hamstrings and hip flexors  Toes warm and well perfused, brisk capillary refill. Palpable 2+ dp pulses.  calf soft and nttp or squeeze.  Edema: none    RIGHT  KNEE EXAM:    Skin: intact, no ecchymosis or erythema  Incisions: skin edges well approximated, sutures in place. Dry. No erythema.  ROM: 0 extension to 125 flexion  Effusion: small to moderate  Tender: NTTP med/lat joint line, anterior or posterior knee           X-RAY: none indicated.      Impression: Shaggy Pierce is a 57 year old male 2 weeks status post right knee arthroscopy and partial medial meniscectomy, shaving chondroplasty and pes anserine ganglion cyst excision , doing well.       Plan: routine postoperative knee arthroscopy  * suture removal  * reviewed arthroscopy photos    * WB status: Cautioned not to overdo it too quickly. Increased activities too soon will lead to more swelling of the knee and leg, more pain, slower recovery. Expect 4-6 weeks. Take it easy returning to biking, slow and gradual.    * Rest  * Activity modification - avoid activities that aggravate symptoms.  * NSAIDS - regular use for inflammation, with food, as long as no contra-indications. Please discuss with pcp if needed.  * Ice twice daily to three times daily as needed.  * Compression wrap as needed.  * Elevation of extremity to reduce swelling as needed.  * home exercise program for strengthening, stretching and range of motion exercises, effusion control.  * Tylenol  as needed for pain  * return to clinic as needed.      * We did also discuss that based on the amount of arthritic changes seen at the time of surgery, may have continued knee pain due to the arthritis. Once recovered from the knee arthroscopy, and arthritic symptoms persist, consider full treatment of arthritis starting with Physical Therapy and injections, NSAIDS, activity modification, bracing.    The information in this document, created by a scribe for me, accurately reflects the services I personally performed and the decisions made by me. I have reviewed and approved this document for accuracy.        Epifanio Manjarrez M.D., M.S.  Dept. of Orthopaedic Surgery  [facility]      Again, thank you for allowing me to participate in the care of your patient.        Sincerely,        Epifanio Manjarrez MD

## 2017-12-27 NOTE — MR AVS SNAPSHOT
"              After Visit Summary   12/27/2017    Shaggy Pierce    MRN: [id number]           Patient Information     Date Of Birth          1960        Visit Information        Provider Department      12/27/2017 9:00 AM Epifanio Manjarrez MD [facility]        Today's Diagnoses     S/P arthroscopic surgery of right knee    -  1       Follow-ups after your visit        Follow-up notes from your care team     Return if symptoms worsen or fail to improve.      Who to contact     If you have questions or need follow up information about today's clinic visit or your schedule please contact [facility] directly at [phone].  Normal or non-critical lab and imaging results will be communicated to you by MyChart, letter or phone within 4 business days after the clinic has received the results. If you do not hear from us within 7 days, please contact the clinic through [employer] or phone. If you have a critical or abnormal lab result, we will notify you by phone as soon as possible.  Submit refill requests through [employer] or call your pharmacy and they will forward the refill request to us. Please allow 3 business days for your refill to be completed.          Additional Information About Your Visit        MyChart Information     [employer] gives you secure access to your electronic health record. If you see a primary care provider, you can also send messages to your care team and make appointments. If you have questions, please call your primary care clinic.  If you do not have a primary care provider, please call [phone] and they will assist you.        Care EveryWhere ID     This is your Care EveryWhere ID. This could be used by other organizations to access your [place] medical records  [id number]        Your Vitals Were     Respirations Height BMI (Body Mass Index)             16 5' 8\" (1.727 m) 34.52 kg/m2          Blood Pressure from Last 3 Encounters:   12/15/17 (!) 132/96 "   12/07/17 126/80   11/10/17 (!) 157/92    Weight from Last 3 Encounters:   12/27/17 227 lb (103 kg)   12/07/17 227 lb 8 oz (103.2 kg)   11/10/17 226 lb 12.8 oz (102.9 kg)              Today, you had the following     No orders found for display       Primary Care Provider Office Phone # Fax #    Jenae Gray [phone]       [address] [address]        Equal Access to Services     [facility]: Hadii aad ku hadasho Soomaali, waaxda luqadaha, qaybta kaalmada ademaryyafreddie, tanner baker . So [facility] [phone].    ATENCIÓN: Si habla español, tiene a silverman disposición servicios gratuitos de asistencia lingüística. [facility] [phone].    We comply with applicable federal civil rights laws and [place] laws. We do not discriminate on the basis of race, color, national origin, age, disability, sex, sexual orientation, or gender identity.            Thank you!     Thank you for choosing [facility]  for your care. Our goal is always to provide you with excellent care. Hearing back from our patients is one way we can continue to improve our services. Please take a few minutes to complete the written survey that you may receive in the mail after your visit with us. Thank you!             Your Updated Medication List - Protect others around you: Learn how to safely use, store and throw away your medicines at www.disposemymeds.org.          This list is accurate as of: 12/27/17 10:30 AM.  Always use your most recent med list.                   Brand Name Dispense Instructions for use Diagnosis    atorvastatin 20 MG tablet    LIPITOR    90 tablet    Take 1 tablet (20 mg) by mouth daily    Hyperlipidemia LDL goal <130       FLONASE 50 MCG/ACT spray   Generic drug:  fluticasone      Spray 2 sprays into both nostrils daily        HYDROcodone-acetaminophen 5-325 MG per tablet    NORCO    30 tablet    Take 1-2 tablets  by mouth every 4 hours as needed for other (Moderate to Severe Pain)    Tear of medial meniscus of right knee, current, subsequent encounter       levothyroxine 100 MCG tablet    SYNTHROID/LEVOTHROID    30 tablet    Take 1 tablet (100 mcg) by mouth daily    Hypothyroidism, unspecified type       senna-docusate 8.6-50 MG per tablet    SENOKOT-S;PERICOLACE    60 tablet    Take 1-2 tablets by mouth 2 times daily Take while on oral narcotics to prevent or treat constipation.    Tear of medial meniscus of right knee, current, subsequent encounter

## 2018-07-23 ENCOUNTER — OFFICE VISIT (OUTPATIENT)
Dept: FAMILY MEDICINE | Facility: CLINIC | Age: 58
End: 2018-07-23
Payer: COMMERCIAL

## 2018-07-23 VITALS
HEART RATE: 125 BPM | RESPIRATION RATE: 14 BRPM | WEIGHT: 222.4 LBS | HEIGHT: 68 IN | TEMPERATURE: 99.1 F | OXYGEN SATURATION: 95 % | BODY MASS INDEX: 33.71 KG/M2

## 2018-07-23 DIAGNOSIS — R07.0 THROAT PAIN: Primary | ICD-10-CM

## 2018-07-23 DIAGNOSIS — H92.02 OTALGIA, LEFT: ICD-10-CM

## 2018-07-23 LAB
DEPRECATED S PYO AG THROAT QL EIA: NORMAL
SPECIMEN SOURCE: NORMAL

## 2018-07-23 PROCEDURE — 87880 STREP A ASSAY W/OPTIC: CPT | Performed by: FAMILY MEDICINE

## 2018-07-23 PROCEDURE — 99213 OFFICE O/P EST LOW 20 MIN: CPT | Performed by: FAMILY MEDICINE

## 2018-07-23 PROCEDURE — 87081 CULTURE SCREEN ONLY: CPT | Performed by: FAMILY MEDICINE

## 2018-07-23 RX ORDER — AMOXICILLIN 875 MG
875 TABLET ORAL 2 TIMES DAILY
Qty: 20 TABLET | Refills: 0 | Status: SHIPPED | OUTPATIENT
Start: 2018-07-23 | End: 2020-04-27

## 2018-07-23 NOTE — NURSING NOTE
"Chief Complaint   Patient presents with     URI     Initial Pulse 125  Temp 99.1  F (37.3  C) (Oral)  Resp 14  Ht 5' 8\" (1.727 m)  Wt 222 lb 6.4 oz (100.9 kg)  SpO2 95%  BMI 33.82 kg/m2 Estimated body mass index is 33.82 kg/(m^2) as calculated from the following:    Height as of this encounter: 5' 8\" (1.727 m).    Weight as of this encounter: 222 lb 6.4 oz (100.9 kg).  BP completed using cuff size: giovanni Eason  "

## 2018-07-23 NOTE — PROGRESS NOTES
SUBJECTIVE:   Shaggy Pierce is a 57 year old male who presents to clinic today for the following health issues:    RESPIRATORY SYMPTOMS    Duration: x 5 days    Description  nasal congestion, sore throat, facial pain/pressure, cough, wheezing, fever, ear pain left, headache, fatigue/malaise, hoarse voice and myalgias    Severity: moderate    Accompanying signs and symptoms: None    History (predisposing factors):  none    Precipitating or alleviating factors: flonase, Naproxen, dayquil, nyquil    Therapies tried and outcome:  none    Reporting sore throat fever and chills, pain in the left ear and fatigue.  He used to have recurrent sore throats when he was young, also did have some infections.    Problem list and histories reviewed & adjusted, as indicated.  Additional history: as documented    Patient Active Problem List   Diagnosis     CARDIOVASCULAR SCREENING; LDL GOAL LESS THAN 160     Nasal septal defect     Hypothyroidism, unspecified type     History of colonic polyps     Umbilical hernia without obstruction and without gangrene     Hyperlipidemia LDL goal <130     Past Surgical History:   Procedure Laterality Date     ARTHROSCOPY KNEE Right 12/15/2017    Procedure: ARTHROSCOPY KNEE;  Right knee arthroscopy, medial meniscal  debridement. open pes cyst excision;  Surgeon: Epifanio Manjarrez MD;  Location: [facility] OR     COLONOSCOPY       COSMETIC SURGERY       HERNIA REPAIR  3/11     SEPTOPLASTY         Social History   Substance Use Topics     Smoking status: Never Smoker     Smokeless tobacco: Former User     Quit date: 1990     Alcohol use No     Family History   Problem Relation Age of Onset     Eye Disorder Mother      Thyroid Disease Mother      Cancer Father 63      of leukemia due to chemical exposure in      Cancer - colorectal Maternal Grandfather      HEART DISEASE Paternal Grandfather          Reviewed and updated as needed this visit by clinical staff  Tobacco  Allergies  " Meds  Med Hx  Surg Hx  Fam Hx  Soc Hx      ROS:  Ccardiovascular, pulmonary, gi and gu systems are negative, except as otherwise noted.    OBJECTIVE:     Pulse 125  Temp 99.1  F (37.3  C) (Oral)  Resp 14  Ht 5' 8\" (1.727 m)  Wt 222 lb 6.4 oz (100.9 kg)  SpO2 95%  BMI 33.82 kg/m2  Body mass index is 33.82 kg/(m^2).  GENERAL: Fatigued looking, alert and no distress  ENT: Slightly tonsillar inflammation with exudate on the right, left ear effusion.  NECK: no adenopathy and thyroid normal to palpation  RESP: lungs clear to auscultation - no rales, rhonchi or wheezes  CV: regular rate and rhythm, no murmur, click or rub, no peripheral edema   ABDOMEN: soft, nontender,  no masses and bowel sounds normal  MS: no gross musculoskeletal defects noted, no edema.    Results for orders placed or performed in visit on 07/23/18   Strep, Rapid Screen   Result Value Ref Range    Specimen Description Throat     Rapid Strep A Screen       NEGATIVE: No Group A streptococcal antigen detected by immunoassay, await culture report.     ASSESSMENT/PLAN:     (R07.0) Throat pain  (primary encounter diagnosis)  Comment: Strep throat negative.  Also exudates or effusion discussed doing an antibiotic.  Await strep cultures  Plan: Strep, Rapid Screen, Beta strep group A         culture, amoxicillin (AMOXIL) 875 MG tablet    (H92.02) Otalgia, left  Comment: Likely infection, will do an antibiotic.  Plan: amoxicillin (AMOXIL) 875 MG tablet    Call or return to clinic prn if these symptoms worsen or fail to improve as anticipated in 1 week.    Beni Jackson MD  [facility]  "

## 2018-07-23 NOTE — MR AVS SNAPSHOT
After Visit Summary   7/23/2018    Shaggy Pierce    MRN: [id number]           Patient Information     Date Of Birth          1960        Visit Information        Provider Department      7/23/2018 6:00 PM Beni Jackson MD [facility]        Today's Diagnoses     Throat pain    -  1    Otalgia, left          Care Instructions    [facility]    If you have any questions regarding to your visit please contact your care team:       Team Purple:   Clinic Hours Telephone Number   Dr. Jenae Ball   7am-7pm  Monday - Thursday   7am-5pm  Fridays  [phone]  (Appointment scheduling available 24/7)    Questions about your recent visit?   Team Line:  [phone]   Urgent Care - [place] and [facility] - 11am-9pm Monday-Friday Saturday-Sunday- 9am-5pm   [place] - 5pm-9pm Monday-Friday Saturday-Sunday- 9am-5pm  [phone] - [place]  [phone] [facility]       What options do I have for a visit other than an office visit? We offer electronic visits (e-visits) and telephone visits, when medically appropriate.  Please check with your medical insurance to see if these types of visits are covered, as you will be responsible for any charges that are not paid by your insurance.      You can use [employer] (secure electronic communication) to access to your chart, send your primary care provider a message, or make an appointment. Ask a team member how to get started.     For a price quote for your services, please call our Consumer Price Line at [phone] or our Imaging Cost estimation line at [phone] (for imaging tests).    Delio Eason            Follow-ups after your visit        Who to contact     If you have questions or need follow up information about today's clinic visit or your schedule please contact [facility] directly at [phone].  Normal or non-critical lab and  "imaging results will be communicated to you by MyChart, letter or phone within 4 business days after the clinic has received the results. If you do not hear from us within 7 days, please contact the clinic through [employer] or phone. If you have a critical or abnormal lab result, we will notify you by phone as soon as possible.  Submit refill requests through [employer] or call your pharmacy and they will forward the refill request to us. Please allow 3 business days for your refill to be completed.          Additional Information About Your Visit        [email or web address] Information     [employer] gives you secure access to your electronic health record. If you see a primary care provider, you can also send messages to your care team and make appointments. If you have questions, please call your primary care clinic.  If you do not have a primary care provider, please call [phone] and they will assist you.        Care EveryWhere ID     This is your Care EveryWhere ID. This could be used by other organizations to access your [place] medical records  [id number]        Your Vitals Were     Pulse Temperature Respirations Height Pulse Oximetry BMI (Body Mass Index)    125 99.1  F (37.3  C) (Oral) 14 5' 8\" (1.727 m) 95% 33.82 kg/m2       Blood Pressure from Last 3 Encounters:   12/15/17 (!) 132/96   12/07/17 126/80   11/10/17 (!) 157/92    Weight from Last 3 Encounters:   07/23/18 222 lb 6.4 oz (100.9 kg)   12/27/17 227 lb (103 kg)   12/07/17 227 lb 8 oz (103.2 kg)              We Performed the Following     Beta strep group A culture     Strep, Rapid Screen          Today's Medication Changes          These changes are accurate as of 7/23/18  6:33 PM.  If you have any questions, ask your nurse or doctor.               Start taking these medicines.        Dose/Directions    amoxicillin 875 MG tablet   Commonly known as:  AMOXIL   Used for:  Throat pain, Otalgia, left   Started by:  Beni Jackson MD        Dose:  875 mg "   Take 1 tablet (875 mg) by mouth 2 times daily   Quantity:  20 tablet   Refills:  0         These medicines have changed or have updated prescriptions.        Dose/Directions    senna-docusate 8.6-50 MG per tablet   Commonly known as:  SENOKOT-S;PERICOLACE   This may have changed:    - when to take this  - reasons to take this  - additional instructions   Used for:  Tear of medial meniscus of right knee, current, subsequent encounter        Dose:  1-2 tablet   Take 1-2 tablets by mouth 2 times daily Take while on oral narcotics to prevent or treat constipation.   Quantity:  60 tablet   Refills:  0            Where to get your medicines      [facility] [address]  [address], [place] [address]     Phone:  [phone]     amoxicillin 875 MG tablet                Primary Care Provider Office Phone # Fax #    Jenae Gray [phone] [address] [address]        Equal Access to Services     [facility] : Hadii sherita gonzalez hadasho Soomaali, waaxda luqadaha, qaybta kaalmada adeegyada, tanner baker . So [facility] [phone].    ATENCIÓN: Si habla español, tiene a silverman disposición servicios gratuitos de asistencia lingüística. [facility] [phone].    We comply with applicable federal civil rights laws and [place] laws. We do not discriminate on the basis of race, color, national origin, age, disability, sex, sexual orientation, or gender identity.            Thank you!     Thank you for choosing [facility]  for your care. Our goal is always to provide you with excellent care. Hearing back from our patients is one way we can continue to improve our services. Please take a few minutes to complete the written survey that you may receive in the mail after your visit with us. Thank you!             Your Updated Medication List - Protect  others around you: Learn how to safely use, store and throw away your medicines at www.disposemymeds.org.          This list is accurate as of 7/23/18  6:33 PM.  Always use your most recent med list.                   Brand Name Dispense Instructions for use Diagnosis    amoxicillin 875 MG tablet    AMOXIL    20 tablet    Take 1 tablet (875 mg) by mouth 2 times daily    Throat pain, Otalgia, left       atorvastatin 20 MG tablet    LIPITOR    90 tablet    Take 1 tablet (20 mg) by mouth daily    Hyperlipidemia LDL goal <130       FLONASE 50 MCG/ACT spray   Generic drug:  fluticasone      Spray 2 sprays into both nostrils daily        levothyroxine 100 MCG tablet    SYNTHROID/LEVOTHROID    30 tablet    Take 1 tablet (100 mcg) by mouth daily    Hypothyroidism, unspecified type       senna-docusate 8.6-50 MG per tablet    SENOKOT-S;PERICOLACE    60 tablet    Take 1-2 tablets by mouth 2 times daily Take while on oral narcotics to prevent or treat constipation.    Tear of medial meniscus of right knee, current, subsequent encounter

## 2018-07-23 NOTE — PATIENT INSTRUCTIONS
[facility]    If you have any questions regarding to your visit please contact your care team:       Team Purple:   Clinic Hours Telephone Number   Dr. Jenae Ball   7am-7pm  Monday - Thursday   7am-5pm  Fridays  [phone]  (Appointment scheduling available 24/7)    Questions about your recent visit?   Team Line:  [phone]   Urgent Care - [place] and [facility] - 11am-9pm Monday-Friday Saturday-Sunday- 9am-5pm   [place] - 5pm-9pm Monday-Friday Saturday-Sunday- 9am-5pm  [phone] - [place]  [phone] - [place]       What options do I have for a visit other than an office visit? We offer electronic visits (e-visits) and telephone visits, when medically appropriate.  Please check with your medical insurance to see if these types of visits are covered, as you will be responsible for any charges that are not paid by your insurance.      You can use [email or web address] (secure electronic communication) to access to your chart, send your primary care provider a message, or make an appointment. Ask a team member how to get started.     For a price quote for your services, please call our Consumer Price Line at [phone] or our Imaging Cost estimation line at [phone] (for imaging tests).    Delio Eason

## 2018-07-24 LAB
BACTERIA SPEC CULT: NORMAL
SPECIMEN SOURCE: NORMAL

## 2018-07-26 DIAGNOSIS — E03.9 HYPOTHYROIDISM, UNSPECIFIED TYPE: ICD-10-CM

## 2018-07-26 RX ORDER — LEVOTHYROXINE SODIUM 100 UG/1
100 TABLET ORAL DAILY
Qty: 90 TABLET | Refills: 0 | Status: SHIPPED | OUTPATIENT
Start: 2018-07-26 | End: 2018-10-18

## 2018-07-27 ENCOUNTER — MYC MEDICAL ADVICE (OUTPATIENT)
Dept: FAMILY MEDICINE | Facility: CLINIC | Age: 58
End: 2018-07-27

## 2018-07-27 NOTE — TELEPHONE ENCOUNTER
Patient was seen on 7/23/18 for throat and ear pain and was given Amoxicillin BID for 10 days.  See [employer] message below.   Liz Sandoval RN

## 2018-10-18 DIAGNOSIS — E03.9 HYPOTHYROIDISM, UNSPECIFIED TYPE: ICD-10-CM

## 2018-10-19 RX ORDER — LEVOTHYROXINE SODIUM 100 UG/1
TABLET ORAL
Qty: 90 TABLET | Refills: 0 | Status: SHIPPED | OUTPATIENT
Start: 2018-10-19 | End: 2019-01-17

## 2018-10-19 NOTE — TELEPHONE ENCOUNTER
"Pending Prescriptions:                       Disp   Refills    levothyroxine (SYNTHROID/LEVOTHROID) 100 *90 tab*0            Sig: TAKE 1 TABLET DAILY (DUE FOR VISIT FOR FURTHER           REFILLS)    Failed [facility] refill protocol, see below:    Requested Prescriptions   Pending Prescriptions Disp Refills     levothyroxine (SYNTHROID/LEVOTHROID) 100 MCG tablet [Pharmacy Med Name: L-THYROXINE (SYNTHROID) TABS 100MCG] 90 tablet 0     Sig: TAKE 1 TABLET DAILY (DUE FOR VISIT FOR FURTHER REFILLS)    Thyroid Protocol Failed    10/18/2018  7:13 PM       Failed - Normal TSH on file in past 12 months    Recent Labs   Lab Test  08/25/17   1055   TSH  3.17             Passed - Patient is 12 years or older       Passed - Recent (12 mo) or future (30 days) visit within the authorizing provider's specialty    Patient had office visit in the last 12 months or has a visit in the next 30 days with authorizing provider or within the authorizing provider's specialty.  See \"Patient Info\" tab in inbasket, or \"Choose Columns\" in Meds & Orders section of the refill encounter.              Aurelia Flannery RN    "

## 2019-01-17 DIAGNOSIS — E03.9 HYPOTHYROIDISM, UNSPECIFIED TYPE: ICD-10-CM

## 2019-01-17 RX ORDER — LEVOTHYROXINE SODIUM 100 UG/1
TABLET ORAL
Qty: 30 TABLET | Refills: 0 | Status: SHIPPED | OUTPATIENT
Start: 2019-01-17 | End: 2019-03-04

## 2019-01-17 NOTE — TELEPHONE ENCOUNTER
"Requested Prescriptions   Pending Prescriptions Disp Refills     levothyroxine (SYNTHROID/LEVOTHROID) 100 MCG tablet [Pharmacy Med Name: L-THYROXINE (SYNTHROID) TABS 100MCG] 90 tablet 0     Sig: TAKE 1 TABLET DAILY (DUE FOR VISIT FOR FURTHER REFILLS)    Thyroid Protocol Failed - 1/17/2019  8:09 AM       Failed - Recent (12 mo) or future (30 days) visit within the authorizing provider's specialty    Patient had office visit in the last 12 months or has a visit in the next 30 days with authorizing provider or within the authorizing provider's specialty.  See \"Patient Info\" tab in inbasket, or \"Choose Columns\" in Meds & Orders section of the refill encounter.             Failed - Normal TSH on file in past 12 months    Recent Labs   Lab Test 08/25/17  1055   TSH 3.17             Passed - Patient is 12 years or older       Passed - Medication is active on med list        Routing refill request to provider for review/approval because:  Labs not current:  TSH    Coty Schneider RN          "

## 2019-02-25 ENCOUNTER — DOCUMENTATION ONLY (OUTPATIENT)
Dept: LAB | Facility: CLINIC | Age: 59
End: 2019-02-25

## 2019-02-25 DIAGNOSIS — E03.9 ACQUIRED HYPOTHYROIDISM: Primary | ICD-10-CM

## 2019-02-25 DIAGNOSIS — E78.5 HYPERLIPIDEMIA LDL GOAL <130: ICD-10-CM

## 2019-02-25 DIAGNOSIS — Z13.1 DIABETES MELLITUS SCREENING: ICD-10-CM

## 2019-02-25 NOTE — PROGRESS NOTES
Noted lab appointment for tomorrow is at 5:45PM    LM on patient's VM with update that lab order requested has been placed but he is also due for some fasting labs, should fast at least 8 hours prior to blood draw.  Scheduling number given for patient to call back and reschedule appointment if needed    Jeevan Tan RN

## 2019-02-25 NOTE — PROGRESS NOTES
Patient has Hyperlipidemia as well as Hypothyroidism, put in orders for LDL and TSH as well as Glucose; please ask him to fast for at least 8 hours

## 2019-02-25 NOTE — PROGRESS NOTES
Dr. Gray was this patient's  primary care provider. Patient is questing thyroid testing due in . If testing is needed please review, associate diagnosis and sign laboratory orders for patient upcoming appointment on 2/26/19. Thank you.

## 2019-02-26 DIAGNOSIS — Z13.1 DIABETES MELLITUS SCREENING: ICD-10-CM

## 2019-02-26 DIAGNOSIS — E78.5 HYPERLIPIDEMIA LDL GOAL <130: ICD-10-CM

## 2019-02-26 DIAGNOSIS — E03.9 ACQUIRED HYPOTHYROIDISM: ICD-10-CM

## 2019-02-26 PROCEDURE — 80061 LIPID PANEL: CPT | Performed by: FAMILY MEDICINE

## 2019-02-26 PROCEDURE — 82947 ASSAY GLUCOSE BLOOD QUANT: CPT | Performed by: FAMILY MEDICINE

## 2019-02-26 PROCEDURE — 36415 COLL VENOUS BLD VENIPUNCTURE: CPT | Performed by: FAMILY MEDICINE

## 2019-02-26 PROCEDURE — 84443 ASSAY THYROID STIM HORMONE: CPT | Performed by: FAMILY MEDICINE

## 2019-02-27 LAB
CHOLEST SERPL-MCNC: 193 MG/DL
GLUCOSE SERPL-MCNC: 86 MG/DL (ref 70–99)
HDLC SERPL-MCNC: 42 MG/DL
LDLC SERPL CALC-MCNC: 123 MG/DL
NONHDLC SERPL-MCNC: 151 MG/DL
TRIGL SERPL-MCNC: 140 MG/DL
TSH SERPL DL<=0.005 MIU/L-ACNC: 2.94 MU/L (ref 0.4–4)

## 2019-03-04 DIAGNOSIS — E78.5 HYPERLIPIDEMIA LDL GOAL <130: ICD-10-CM

## 2019-03-04 DIAGNOSIS — E03.9 HYPOTHYROIDISM, UNSPECIFIED TYPE: ICD-10-CM

## 2019-03-04 RX ORDER — ATORVASTATIN CALCIUM 20 MG/1
20 TABLET, FILM COATED ORAL DAILY
Qty: 90 TABLET | Refills: 4 | Status: CANCELLED | OUTPATIENT
Start: 2019-03-04

## 2019-03-04 RX ORDER — LEVOTHYROXINE SODIUM 100 UG/1
100 TABLET ORAL DAILY
Qty: 90 TABLET | Refills: 0 | Status: SHIPPED | OUTPATIENT
Start: 2019-03-04 | End: 2019-05-13

## 2019-05-13 DIAGNOSIS — E03.9 HYPOTHYROIDISM, UNSPECIFIED TYPE: ICD-10-CM

## 2019-05-14 RX ORDER — LEVOTHYROXINE SODIUM 100 UG/1
TABLET ORAL
Qty: 90 TABLET | Refills: 1 | Status: SHIPPED | OUTPATIENT
Start: 2019-05-14 | End: 2019-09-03

## 2019-11-07 DIAGNOSIS — E03.9 HYPOTHYROIDISM, UNSPECIFIED TYPE: ICD-10-CM

## 2019-11-07 RX ORDER — LEVOTHYROXINE SODIUM 100 UG/1
TABLET ORAL
Qty: 90 TABLET | Refills: 0 | Status: SHIPPED | OUTPATIENT
Start: 2019-11-07 | End: 2020-02-04

## 2020-02-02 DIAGNOSIS — E03.9 HYPOTHYROIDISM, UNSPECIFIED TYPE: ICD-10-CM

## 2020-02-02 NOTE — LETTER
February 4, 2020          Shaggy Pierce,  [address] [place] [address]          Dear Shaggy Pierce      Your provider has sent a 30 day pamela refill of levothyroxine (SYNTHROID/LEVOTHROID) 100 MCG tablet. You are due for an appointment for further refills. Appointment options could include: an in person office visit, telephone visit or Evisit through [employer]. Please contact the clinic to schedule an appointment for further refills.       If you have received your health care elsewhere, please call the clinic so the information can be documented in your chart.    Please call [phone] or message us through your [employer] account to schedule an appointment or provide information for your chart.     Feel free to contact us if you have any questions or concerns!    I look forward to seeing you and working with you on your health care needs.     Sincerely,       Your [place] Care Team/HV

## 2020-02-03 NOTE — TELEPHONE ENCOUNTER
Disp Refills Start End MICHAEL   levothyroxine (SYNTHROID/LEVOTHROID) 100 MCG tablet 90 tablet 0 11/7/2019  No   Sig: TAKE 1 TABLET DAILY   Sent to pharmacy as: levothyroxine (SYNTHROID/LEVOTHROID) 100 MCG tablet   Class: E-Prescribe   Order: [id number]   E-Prescribing Status: Receipt confirmed by pharmacy (11/7/2019  6:55 PM CST)     Ashlie Hauser MA on 2/3/2020 at 11:52 AM

## 2020-02-04 RX ORDER — LEVOTHYROXINE SODIUM 100 UG/1
TABLET ORAL
Qty: 90 TABLET | Refills: 0 | Status: SHIPPED | OUTPATIENT
Start: 2020-02-04 | End: 2020-04-29

## 2020-03-01 ENCOUNTER — HEALTH MAINTENANCE LETTER (OUTPATIENT)
Age: 60
End: 2020-03-01

## 2020-04-27 DIAGNOSIS — E03.9 HYPOTHYROIDISM, UNSPECIFIED TYPE: ICD-10-CM

## 2020-04-27 RX ORDER — LEVOTHYROXINE SODIUM 100 UG/1
TABLET ORAL
Start: 2020-04-27

## 2020-04-27 NOTE — TELEPHONE ENCOUNTER
Patient is seeing Cheryl [facility]    Refused Prescriptions:                       Disp   Refills    levothyroxine (SYNTHROID/LEVOTHROID) 100 M*                Sig: TAKE 1 TABLET DAILY (DUE FOR APPOINTMENT)  Refused By: DONAVAN LEZAMA  Reason for Refusal: Patient no longer under provider care

## 2020-04-27 NOTE — TELEPHONE ENCOUNTER
"Routing refill request to provider for review/approval because:  Had TSH labs done through [employer] [facility], result 4.96 - see care everywhere  Per 2/10/2020 mPurat message from Dr. Jackson: The level is slightly elevated with a normal free T4; he should continue his current dose (that is Levothyroxine 100 MCG daily) if has been taking consistently and follow up in 2 months for recheck  Requested Prescriptions   Pending Prescriptions Disp Refills     levothyroxine (SYNTHROID/LEVOTHROID) 100 MCG tablet [Pharmacy Med Name: L-THYROXINE (SYNTHROID) TABS 100MCG] 90 tablet 3     Sig: TAKE 1 TABLET DAILY (DUE FOR APPOINTMENT)       Thyroid Protocol Failed - 4/27/2020 12:08 PM        Failed - Recent (12 mo) or future (30 days) visit within the authorizing provider's specialty     Patient has had an office visit with the authorizing provider or a provider within the authorizing providers department within the previous 12 mos or has a future within next 30 days. See \"Patient Info\" tab in inbasket, or \"Choose Columns\" in Meds & Orders section of the refill encounter.              Failed - Normal TSH on file in past 12 months     Recent Labs   Lab Test 02/26/19  1733   TSH 2.94              Passed - Patient is 12 years or older        Passed - Medication is active on med list           Liya Benitez RN  "

## 2020-04-28 ENCOUNTER — E-VISIT (OUTPATIENT)
Dept: FAMILY MEDICINE | Facility: CLINIC | Age: 60
End: 2020-04-28
Payer: COMMERCIAL

## 2020-04-28 ENCOUNTER — MYC MEDICAL ADVICE (OUTPATIENT)
Dept: FAMILY MEDICINE | Facility: CLINIC | Age: 60
End: 2020-04-28

## 2020-04-28 DIAGNOSIS — E03.9 HYPOTHYROIDISM, UNSPECIFIED TYPE: ICD-10-CM

## 2020-04-28 PROCEDURE — 99421 OL DIG E/M SVC 5-10 MIN: CPT | Performed by: NURSE PRACTITIONER

## 2020-04-28 NOTE — TELEPHONE ENCOUNTER
Please see Med.ly message below and advise. Please also refer to encounter dated 2/10/20.    Aliza Dao RN  [facility]

## 2020-04-29 RX ORDER — LEVOTHYROXINE SODIUM 100 UG/1
100 TABLET ORAL DAILY
Qty: 90 TABLET | Refills: 1 | Status: SHIPPED | OUTPATIENT
Start: 2020-04-29 | End: 2020-11-20

## 2020-09-14 ENCOUNTER — DOCUMENTATION ONLY (OUTPATIENT)
Dept: CARE COORDINATION | Facility: CLINIC | Age: 60
End: 2020-09-14

## 2020-10-06 ENCOUNTER — OFFICE VISIT (OUTPATIENT)
Dept: INTERNAL MEDICINE | Facility: CLINIC | Age: 60
End: 2020-10-06
Payer: COMMERCIAL

## 2020-10-06 ENCOUNTER — PATIENT OUTREACH (OUTPATIENT)
Dept: SURGERY | Facility: CLINIC | Age: 60
End: 2020-10-06

## 2020-10-06 VITALS
BODY MASS INDEX: 35.58 KG/M2 | WEIGHT: 234 LBS | SYSTOLIC BLOOD PRESSURE: 134 MMHG | HEART RATE: 70 BPM | OXYGEN SATURATION: 96 % | DIASTOLIC BLOOD PRESSURE: 84 MMHG

## 2020-10-06 DIAGNOSIS — Z00.00 ENCOUNTER FOR PREVENTIVE CARE: ICD-10-CM

## 2020-10-06 DIAGNOSIS — E78.5 HYPERLIPIDEMIA, UNSPECIFIED HYPERLIPIDEMIA TYPE: ICD-10-CM

## 2020-10-06 DIAGNOSIS — E03.9 HYPOTHYROIDISM, UNSPECIFIED TYPE: ICD-10-CM

## 2020-10-06 DIAGNOSIS — Z86.0100 HISTORY OF COLONIC POLYPS: ICD-10-CM

## 2020-10-06 DIAGNOSIS — K42.9 UMBILICAL HERNIA WITHOUT OBSTRUCTION AND WITHOUT GANGRENE: ICD-10-CM

## 2020-10-06 DIAGNOSIS — Z00.00 ENCOUNTER FOR PREVENTIVE CARE: Primary | ICD-10-CM

## 2020-10-06 LAB
CHOLEST SERPL-MCNC: 216 MG/DL
HBA1C MFR BLD: 5.3 % (ref 0–5.6)
HDLC SERPL-MCNC: 43 MG/DL
LDLC SERPL CALC-MCNC: 139 MG/DL
NONHDLC SERPL-MCNC: 174 MG/DL
TRIGL SERPL-MCNC: 173 MG/DL

## 2020-10-06 PROCEDURE — 83036 HEMOGLOBIN GLYCOSYLATED A1C: CPT | Performed by: PATHOLOGY

## 2020-10-06 PROCEDURE — 84443 ASSAY THYROID STIM HORMONE: CPT | Performed by: PATHOLOGY

## 2020-10-06 PROCEDURE — 80061 LIPID PANEL: CPT | Performed by: PATHOLOGY

## 2020-10-06 PROCEDURE — 90471 IMMUNIZATION ADMIN: CPT | Performed by: STUDENT IN AN ORGANIZED HEALTH CARE EDUCATION/TRAINING PROGRAM

## 2020-10-06 PROCEDURE — 36415 COLL VENOUS BLD VENIPUNCTURE: CPT | Performed by: PATHOLOGY

## 2020-10-06 PROCEDURE — 90686 IIV4 VACC NO PRSV 0.5 ML IM: CPT | Performed by: STUDENT IN AN ORGANIZED HEALTH CARE EDUCATION/TRAINING PROGRAM

## 2020-10-06 PROCEDURE — 99204 OFFICE O/P NEW MOD 45 MIN: CPT | Mod: 25 | Performed by: STUDENT IN AN ORGANIZED HEALTH CARE EDUCATION/TRAINING PROGRAM

## 2020-10-06 ASSESSMENT — PAIN SCALES - GENERAL: PAINLEVEL: NO PAIN (0)

## 2020-10-06 NOTE — PROGRESS NOTES
"                              Internal [facility]  -----------------------------------------------------------------  History of Present Illness   Shaggy Pierce is a 60 year old male with a history of hypothyroidism, HLD here to establish care and for \"hernia/ stomach concerns\" per patient.    Here today hoping to get surgery on hernia that he conservatively managed previously. Can be mildly painful and fairly uncomfortable with certain activities such as doing ab crunches, which he is unable to do because of this. Presented originally in 2016 for an umbilical mass, with plan for conservative management given did not want to cease physical exercise for 4 weeks after surgery given his cycling hobby for exercise.    11/2011 had colonoscopy with tubular adenoma, 2mm completely resected. Was not present for last GI appointment at which she was supposed to have repeat colonoscopy.  He is amenable for referral to GI again at this time.    Otherwise no acute concerns and has been doing decently. He did undergo a divorce this year though he states in some ways is a relief to have finished the process.      Social and Holistic Factors    Tobacco: cigars on occasion  EtOH: every couple weeks moderately  Other drugs: no  Living situation: alone  Mood:  okay  Stress:   Exercise: 45 mins on a   Diet: has a slimfast in the AM, omelette at lunch with sausage, dinners, hot dogs/ burgers/ porks Sick contacts: no  Sexual history:   Employment: working as , team [employer]  Education:   Travel: none recent  Social support: daughter in [place],s on on [employer]  Hobbies/fun: biking, fishing ([place])     -----------------------------------------------------------------  Assessment & Plan   Shaggy Pierce is a 60 year old male with a history of hypothyroidism, HLD here to establish care and umbilical hernia.    Umbilical hernia  Here today hoping to get surgery on hernia that he " conservatively managed previously. Can be mildly painful and fairly uncomfortable with certain activities such as doing ab crunches, which he is unable to do because of this. Presented originally in 2016 for an umbilical mass, with plan for conservative management given did not want to cease physical exercise for 4 weeks after surgery given his cycling hobby for exercise. Physical exam shows 3cm temporarily reducible soft tissue mass adjacent to umbilicus nontender and non erythematous.  - Surgery referral    Hypothyroidism  - TSH check today    Health maintenance  - Flu vax  - Colonoscopy (2011 tubular adenoma 2mm)  - A1C  - HLD    Hx tubular adenoma 2011  - GI colonoscopy procedure referral    Diabetes Risk Reduction  Hemoglobin A1C   Date Value Ref Range Status   10/06/2020 5.3 0 - 5.6 % Final     Comment:     Normal <5.7% Prediabetes 5.7-6.4%  Diabetes 6.5% or higher - adopted from ADA   consensus guidelines.     In normal range today.       Hypertension and Lipids Risk Reduction  The 10-year ASCVD risk score (Abhijeet AYALA Jr., et al., 2013) is: 10.3%    Values used to calculate the score:      Age: 60 years      Sex: Male      Is Non- : No      Diabetic: No      Tobacco smoker: No      Systolic Blood Pressure: 134 mmHg      Is BP treated: No      HDL Cholesterol: 43 mg/dL      Total Cholesterol: 216 mg/dL  Medications     HMG CoA Reductase Inhibitors     atorvastatin (LIPITOR) 20 MG tablet         He unfortunately desired to stop taking his statin due to concern for unnecessary medications. We discussed the importance of a statin today and compromised to check cholesterol to see the effect of not taking his medication with plan to restart as soon as he is willing and if cholesterol is elevated.  - Restart lipitor 20mg once patient agreeable    Hypertension Risk Reduction  BP Readings from Last 6 Encounters:   10/06/20 134/84   12/15/17 (!) 132/96   12/07/17 126/80   11/10/17 (!) 157/92    08/25/17 (!) 140/98   03/31/16 132/80       Mariano Aly MD (PGY-3), staffed with Dr. Agustin  [facility]  Staff addendum: Attending Addendum:  Patient seen and examined with resident in clinic today.  Pertinent portions of history and exam were independently verified by myself.  I agree with the exam and plan as outlined above with the following modifications: none.  Lana Agustin MD  Internal Medicine    -----------------------------------------------------------------  Physical Exam   /84 (BP Location: Right arm, Patient Position: Sitting, Cuff Size: Adult Large)   Pulse 70   Wt 106.1 kg (234 lb)   SpO2 96%   BMI 35.58 kg/m    Wt Readings from Last 3 Encounters:   10/06/20 106.1 kg (234 lb)   07/23/18 100.9 kg (222 lb 6.4 oz)   12/27/17 103 kg (227 lb)     Body mass index is 35.58 kg/m .    General Appearance: in no apparent distress  HEENT: pupils equal and reactive to light  Respiratory: CTAB  Cardiovascular: RRR, no murmurs  GI: abdomen nontender, nondistended, BS+, 3cm temporarily reducible soft tissue mass adjacent to umbilicus nontender and nonerythematous  Lymph/Hematologic: no LAD  Genitourinary: not examined  Skin: no rashes   Musculoskeletal: no peripheral edema  Neurologic: A@O  Psychiatric: appropriate affect    -----------------------------------------------------------------  Additional Patient History  Social History   Social History     Tobacco Use     Smoking status: Never Smoker     Smokeless tobacco: Former User   Substance Use Topics     Alcohol use: No     Drug use: No       Current Medications   Current Outpatient Medications   Medication Sig Dispense Refill     fluticasone (FLONASE) 50 MCG/ACT nasal spray Spray 2 sprays into both nostrils daily       levothyroxine (SYNTHROID/LEVOTHROID) 100 MCG tablet Take 1 tablet (100 mcg) by mouth daily 90 tablet 1     atorvastatin (LIPITOR) 20 MG tablet Take 1 tablet (20 mg) by mouth daily 90 tablet 4      senna-docusate (SENOKOT-S;PERICOLACE) 8.6-50 MG per tablet Take 1-2 tablets by mouth 2 times daily Take while on oral narcotics to prevent or treat constipation. (Patient taking differently: Take 1-2 tablets by mouth 2 times daily as needed Take while on oral narcotics to prevent or treat constipation.) 60 tablet 0    Stopped statin.     Past Medical History    Past Medical History:   Diagnosis Date     Thyroid disease 2000       Past Surgical History   Past Surgical History:   Procedure Laterality Date     ARTHROSCOPY KNEE Right 12/15/2017    Procedure: ARTHROSCOPY KNEE;  Right knee arthroscopy, medial meniscal  debridement. open pes cyst excision;  Surgeon: Epifanio Manjarrez MD;  Location: [facility] OR     COLONOSCOPY       COSMETIC SURGERY       HERNIA REPAIR  3/11     SEPTOPLASTY         Family History    Family History   Problem Relation Age of Onset     Eye Disorder Mother      Thyroid Disease Mother      Cancer Father 63         of leukemia due to chemical exposure in      Cancer - colorectal Maternal Grandfather      Heart Disease Paternal Grandfather        Allergies      Allergies   Allergen Reactions     Aspirin [Bufferin Low Dose] Hives     Seasonal Allergies        Review of Systems   The 10 point Review of Systems is negative other than noted in the HPI or here.    Data: Reviewed in Epic.

## 2020-10-06 NOTE — NURSING NOTE
Chief Complaint   Patient presents with     Establish Care     pt here to establish care     Hernia     pt here to discuss hernia       Katina Dotson CMA, EMT at 8:03 AM on 10/6/2020.

## 2020-10-06 NOTE — PROGRESS NOTES
A referral was placed for this patient to consult with the General Surgery Team regarding an abdominal hernia.  Attempted to reach patient with no answer. LM on VM to call scheduling line.

## 2020-10-06 NOTE — PATIENT INSTRUCTIONS
[facility] Medication Refill Request Information:  * Please contact your pharmacy regarding ANY request for medication refills.  ** [facility] Prescription Fax = [phone]  * Please allow 3 business days for routine medication refills.  * Please allow 5 business days for controlled substance medication refills.     [facility] Test Result notification information:  *You will be notified with in 7-10 days of your appointment day regarding the results of your test.  If you are on MyChart you will be notified as soon as the provider has reviewed the results and signed off on them.    [facility]: [phone]

## 2020-10-06 NOTE — NURSING NOTE
Patient received Flu vaccine. Vaccine was given under the supervision of Dr. Agustin. See immunization list for administration details. Pt was advised to remain in [facility] lobby for 15 minutes in case of an averse reaction. Given by Sara Reilly CMA, EMT 10:03 AM 10/6/2020

## 2020-10-07 LAB — TSH SERPL DL<=0.005 MIU/L-ACNC: 4.6 MU/L (ref 0.4–4)

## 2020-10-09 NOTE — TELEPHONE ENCOUNTER
REFERRAL INFORMATION:    Referring Provider:  Dr. Mariano Ayl     Referring Clinic:  [facility] [place]     Reason for Visit/Diagnosis: Umbilical Hernia        FUTURE VISIT INFORMATION:    Appointment Date: 10/19/2020    Appointment Time: 11:30 AM     NOTES RECORD STATUS  DETAILS   OFFICE NOTE from Referring Provider Internal 10/6/2020 Office visit with Dr. Aly   OFFICE NOTE from Other Specialists Internal 3/31/16 Office visit with Dr. Jenae Gray ([facility])    HOSPITAL DISCHARGE SUMMARY/ ED VISITS  N/A    OPERATIVE REPORT N/A    ENDOSCOPY (EGD)  N/A    PERTINENT LABS Internal    PATHOLOGY REPORTS (RELATED) N/A    IMAGING (CT, MRI, US, XR)  N/A

## 2020-10-16 ENCOUNTER — PATIENT OUTREACH (OUTPATIENT)
Dept: SURGERY | Facility: CLINIC | Age: 60
End: 2020-10-16

## 2020-10-16 NOTE — PROGRESS NOTES
Pre Visit Call and Assessment    Date of call:  10/16/2020    Phone numbers:  Cell number on file:    Telephone Information:   Mobile [phone]       Reached patient/confirmed appointment:  Yes  Patient care team/Primary provider:  Beni Jackson  Preferred outpatient pharmacy:    [id number] - [place] - [address]  [address] [facility] [address]  Phone: [phone] Fax: [phone]    [id number] - [place], [place] - [address]  [address] [facility] [address]  Phone: [phone] Fax: [phone]    [facility] [place] - [address]  [address]  [place] [address]  Phone: [phone] Fax: [phone]    [id number] - [place], [place] - [address]  [address] [facility] [address]  Phone: [phone] Fax: [phone]    [facility] - [place], [place]  [address] [address]  [place] [address]  Phone: [phone] Fax: [phone]    [facility] - [place], [place] - [address]  [address] [address]  Phone: [phone] Fax: [phone]    [id number] - [place], [place] - [address]  [address] [address]  Phone: [phone] Fax: [phone]    [id number] - [place], [place] - [address] [facility]  [address]  [facility] [address]  Phone: [phone] Fax: [phone]    Referred to:  Dr. José Antonio Lopez    Reason for visit:  Umbilical hernia consult

## 2020-10-19 ENCOUNTER — PRE VISIT (OUTPATIENT)
Dept: SURGERY | Facility: CLINIC | Age: 60
End: 2020-10-19

## 2020-10-30 ENCOUNTER — PATIENT OUTREACH (OUTPATIENT)
Dept: SURGERY | Facility: CLINIC | Age: 60
End: 2020-10-30

## 2020-10-30 NOTE — PROGRESS NOTES
Established Patient Telephone Reminder Call    Date of call:  10/30/20  Phone numbers:  Home number on file [phone] (home)    Reached patient/confirmed appointment:  No - left message:   on voicemail  Appointment with:   Dr. José Antonio Lopez  Reason for visit:  Umbilical hernia

## 2020-11-02 ENCOUNTER — OFFICE VISIT (OUTPATIENT)
Dept: SURGERY | Facility: CLINIC | Age: 60
End: 2020-11-02
Payer: COMMERCIAL

## 2020-11-02 VITALS
SYSTOLIC BLOOD PRESSURE: 143 MMHG | BODY MASS INDEX: 35.87 KG/M2 | WEIGHT: 235.9 LBS | OXYGEN SATURATION: 95 % | HEART RATE: 85 BPM | DIASTOLIC BLOOD PRESSURE: 103 MMHG

## 2020-11-02 DIAGNOSIS — E66.01 MORBID OBESITY (H): ICD-10-CM

## 2020-11-02 DIAGNOSIS — K42.9 UMBILICAL HERNIA WITHOUT OBSTRUCTION AND WITHOUT GANGRENE: Primary | ICD-10-CM

## 2020-11-02 PROCEDURE — 99203 OFFICE O/P NEW LOW 30 MIN: CPT | Performed by: SURGERY

## 2020-11-02 NOTE — NURSING NOTE
Chief Complaint   Patient presents with     Consult     Umbilical hernia.       Vitals:    11/02/20 0957 11/02/20 0959 11/02/20 1000   BP: (!) 142/106 (!) 150/105 (!) 143/103   BP Location: Left arm Left arm    Patient Position: Sitting Sitting    Cuff Size: Adult Large Adult Large    Pulse:  87 85   SpO2: 96% 95%    Weight: 235 lb 14.4 oz         Body mass index is 35.87 kg/m .      Trinity Mohan, EMT

## 2020-11-02 NOTE — PATIENT INSTRUCTIONS
You met with Dr. José Antonio Lopez.      Today's visit instructions:    Return to the Surgery Clinic to see Dr. Lopez when you have met your weight loss goal of 205 pounds (he would like you to lose 30 pounds).  If you would like a referral to the Medical Weight Loss Team, please let us know.     If you have questions please contact Chato RN or Alexsandra RN during regular clinic hours, Monday through Friday 7:30 AM - 4:00 PM, or you can contact us via [facility] at anytime.       If you have urgent needs after-hours, weekends, or holidays please call the hospital at [phone] and ask to speak with our on-call General Surgery Team.    Appointment [phone], option #1   Nurse Advice (Chato or Alexsandra): [phone]   Surgery Scheduler (Jennifer): [phone]  Fax: [phone]

## 2020-11-02 NOTE — LETTER
"11/2/2020       RE: Shaggy Pierce  [address]  [facility] [facility] [address]     Dear Colleague,    Thank you for referring your patient, Shaggy Pierce, to the [facility] [place] [facility]. Please see a copy of my visit note below.    Shaggy Pierce is a 60 year old male with a long standing umbilical hernia with the following symptoms of lump and occasional discomfort.      Onset did not occur with lifting.  Obstructive symptoms:  no  Urinary difficulties:  no  Chronic cough: no  Constipation:  no  Current level of activity:  Low, does use bike , desk work as IT    Past medical history, medications, allergies, family history, and social history were reviewed with the patient.    Past Medical History:   Diagnosis Date     Thyroid disease 1/1/2000     Past Surgical History:   Procedure Laterality Date     ARTHROSCOPY KNEE Right 12/15/2017    Procedure: ARTHROSCOPY KNEE;  Right knee arthroscopy, medial meniscal  debridement. open pes cyst excision;  Surgeon: Epifanio Manjarrez MD;  Location: [facility] OR     COLONOSCOPY  8/12     COSMETIC SURGERY  9/13     HERNIA REPAIR  3/11     SEPTOPLASTY  12/12       ROS: 10 point review of systems negative except noted in HPI  PHYSICAL EXAM  General appearance- healthy, alert, and in no distress.  Skin- Skin color and turgor normal.  No obvious rashes.  Neck- Neck is supple without obvious adenopathy.  Lungs- Respiratory effort unlabored.  Gait- Normal.  BMI 35 (5'8\", 235#)  Abdomen - soft non distended, non tender with reducible umbilical hernia    Impression: Wide based umbilical hernia .  Recommend 30 lb weight loss with help from PCP referral for weight management. Will see me once goal reached.  The total time spent with this patient and chart review was 30 minutes.  Of this time, greater than 50% was spent face to face counseling and coordinating care.        José Antonio Lopez MD      "

## 2020-11-02 NOTE — PROGRESS NOTES
"Shaggy Pierce is a 60 year old male with a long standing umbilical hernia with the following symptoms of lump and occasional discomfort.      Onset did not occur with lifting.  Obstructive symptoms:  no  Urinary difficulties:  no  Chronic cough: no  Constipation:  no  Current level of activity:  Low, does use bike , desk work as IT    Past medical history, medications, allergies, family history, and social history were reviewed with the patient.    Past Medical History:   Diagnosis Date     Thyroid disease 1/1/2000     Past Surgical History:   Procedure Laterality Date     ARTHROSCOPY KNEE Right 12/15/2017    Procedure: ARTHROSCOPY KNEE;  Right knee arthroscopy, medial meniscal  debridement. open pes cyst excision;  Surgeon: Epifanio Manjarrez MD;  Location: [facility]     COLONOSCOPY  8/12     COSMETIC SURGERY  9/13     HERNIA REPAIR  3/11     SEPTOPLASTY  12/12       ROS: 10 point review of systems negative except noted in HPI  PHYSICAL EXAM  General appearance- healthy, alert, and in no distress.  Skin- Skin color and turgor normal.  No obvious rashes.  Neck- Neck is supple without obvious adenopathy.  Lungs- Respiratory effort unlabored.  Gait- Normal.  BMI 35 (5'8\", 235#)  Abdomen - soft non distended, non tender with reducible umbilical hernia    Impression: Wide based umbilical hernia .  Recommend 30 lb weight loss with help from PCP referral for weight management. Will see me once goal reached.  The total time spent with this patient and chart review was 30 minutes.  Of this time, greater than 50% was spent face to face counseling and coordinating care.        "

## 2020-11-19 DIAGNOSIS — E03.9 HYPOTHYROIDISM, UNSPECIFIED TYPE: ICD-10-CM

## 2020-11-19 NOTE — TELEPHONE ENCOUNTER
Routing refill request to provider for review/approval because:  Labs out of range:  TSH    TSH   Date Value Ref Range Status   10/06/2020 4.60 (H) 0.40 - 4.00 mU/L Final     Rina De León BSN, RN

## 2020-11-20 RX ORDER — LEVOTHYROXINE SODIUM 100 UG/1
100 TABLET ORAL DAILY
Qty: 90 TABLET | Refills: 0 | Status: SHIPPED | OUTPATIENT
Start: 2020-11-20 | End: 2021-02-19 | Stop reason: DRUGHIGH

## 2020-11-20 NOTE — TELEPHONE ENCOUNTER
Patient was recently seen by another provider for this- routing to address refill.  Luana Felder, CNP

## 2021-02-05 DIAGNOSIS — E03.9 HYPOTHYROIDISM, UNSPECIFIED TYPE: ICD-10-CM

## 2021-02-09 RX ORDER — LEVOTHYROXINE SODIUM 100 UG/1
TABLET ORAL
Qty: 90 TABLET | Refills: 3 | OUTPATIENT
Start: 2021-02-09

## 2021-02-09 NOTE — CONFIDENTIAL NOTE
"Pending Prescriptions:                       Disp   Refills    levothyroxine (SYNTHROID/LEVOTHROID) 100 *90 tab*3            Sig: TAKE 1 TABLET DAILY (PLEASE REPEAT LABS IN NEXT 2           TO 3 MONTHS)    Routing refill request to provider for review/approval because:  Labs out of range:  TSH    Melany Munson RN    Requested Prescriptions   Pending Prescriptions Disp Refills     levothyroxine (SYNTHROID/LEVOTHROID) 100 MCG tablet [Pharmacy Med Name: L-THYROXINE (SYNTHROID) TABS 100MCG] 90 tablet 3     Sig: TAKE 1 TABLET DAILY (PLEASE REPEAT LABS IN NEXT 2 TO 3 MONTHS)       Thyroid Protocol Failed - 2/5/2021 10:03 AM        Failed - Normal TSH on file in past 12 months     Recent Labs   Lab Test 10/06/20  0922   TSH 4.60*              Passed - Patient is 12 years or older        Passed - Recent (12 mo) or future (30 days) visit within the authorizing provider's specialty     Patient has had an office visit with the authorizing provider or a provider within the authorizing providers department within the previous 12 mos or has a future within next 30 days. See \"Patient Info\" tab in inbasket, or \"Choose Columns\" in Meds & Orders section of the refill encounter.              Passed - Medication is active on med list             "

## 2021-02-09 NOTE — TELEPHONE ENCOUNTER
VM left for pt to schedule Thyroid f/u   Is This A New Presentation, Or A Follow-Up?: Phototherapy Treatment

## 2021-02-19 DIAGNOSIS — E03.9 HYPOTHYROIDISM, UNSPECIFIED TYPE: Primary | ICD-10-CM

## 2021-02-19 DIAGNOSIS — E03.9 HYPOTHYROIDISM, UNSPECIFIED TYPE: ICD-10-CM

## 2021-02-19 LAB
T4 FREE SERPL-MCNC: 1.18 NG/DL (ref 0.76–1.46)
TSH SERPL DL<=0.005 MIU/L-ACNC: 4.17 MU/L (ref 0.4–4)

## 2021-02-19 PROCEDURE — 84439 ASSAY OF FREE THYROXINE: CPT | Performed by: PATHOLOGY

## 2021-02-19 PROCEDURE — 36415 COLL VENOUS BLD VENIPUNCTURE: CPT | Performed by: PATHOLOGY

## 2021-02-19 PROCEDURE — 84443 ASSAY THYROID STIM HORMONE: CPT | Performed by: PATHOLOGY

## 2021-02-19 RX ORDER — LEVOTHYROXINE SODIUM 112 UG/1
112 TABLET ORAL DAILY
Qty: 90 TABLET | Refills: 1 | Status: SHIPPED | OUTPATIENT
Start: 2021-02-19 | End: 2021-07-28

## 2021-03-10 ENCOUNTER — MYC MEDICAL ADVICE (OUTPATIENT)
Dept: INTERNAL MEDICINE | Facility: CLINIC | Age: 61
End: 2021-03-10

## 2021-03-15 ENCOUNTER — IMMUNIZATION (OUTPATIENT)
Dept: NURSING | Facility: CLINIC | Age: 61
End: 2021-03-15
Payer: COMMERCIAL

## 2021-03-15 PROCEDURE — 91300 PR COVID VAC PFIZER DIL RECON 30 MCG/0.3 ML IM: CPT

## 2021-03-15 PROCEDURE — 0001A PR COVID VAC PFIZER DIL RECON 30 MCG/0.3 ML IM: CPT

## 2021-04-05 ENCOUNTER — IMMUNIZATION (OUTPATIENT)
Dept: NURSING | Facility: CLINIC | Age: 61
End: 2021-04-05
Attending: INTERNAL MEDICINE
Payer: COMMERCIAL

## 2021-04-05 PROCEDURE — 91300 PR COVID VAC PFIZER DIL RECON 30 MCG/0.3 ML IM: CPT

## 2021-04-05 PROCEDURE — 0002A PR COVID VAC PFIZER DIL RECON 30 MCG/0.3 ML IM: CPT

## 2021-04-17 ENCOUNTER — HEALTH MAINTENANCE LETTER (OUTPATIENT)
Age: 61
End: 2021-04-17

## 2021-05-26 ENCOUNTER — E-VISIT (OUTPATIENT)
Dept: FAMILY MEDICINE | Facility: CLINIC | Age: 61
End: 2021-05-26
Payer: COMMERCIAL

## 2021-05-26 DIAGNOSIS — N52.9 ERECTILE DYSFUNCTION, UNSPECIFIED ERECTILE DYSFUNCTION TYPE: Primary | ICD-10-CM

## 2021-05-26 PROCEDURE — 99207 PR NON-BILLABLE SERV PER CHARTING: CPT | Performed by: FAMILY MEDICINE

## 2021-07-26 DIAGNOSIS — E03.9 HYPOTHYROIDISM, UNSPECIFIED TYPE: Primary | ICD-10-CM

## 2021-07-28 RX ORDER — LEVOTHYROXINE SODIUM 112 UG/1
112 TABLET ORAL DAILY
Qty: 30 TABLET | Refills: 0 | Status: SHIPPED | OUTPATIENT
Start: 2021-07-28

## 2021-10-02 ENCOUNTER — HEALTH MAINTENANCE LETTER (OUTPATIENT)
Age: 61
End: 2021-10-02

## 2022-04-08 ENCOUNTER — APPOINTMENT (OUTPATIENT)
Dept: URBAN - METROPOLITAN AREA CLINIC 259 | Age: 62
Setting detail: DERMATOLOGY
End: 2022-04-11

## 2022-04-08 VITALS — HEIGHT: 68 IN | RESPIRATION RATE: 12 BRPM | WEIGHT: 195 LBS

## 2022-04-08 DIAGNOSIS — L81.4 OTHER MELANIN HYPERPIGMENTATION: ICD-10-CM

## 2022-04-08 DIAGNOSIS — L72.8 OTHER FOLLICULAR CYSTS OF THE SKIN AND SUBCUTANEOUS TISSUE: ICD-10-CM

## 2022-04-08 PROBLEM — D48.5 NEOPLASM OF UNCERTAIN BEHAVIOR OF SKIN: Status: ACTIVE | Noted: 2022-04-08

## 2022-04-08 PROCEDURE — 99203 OFFICE O/P NEW LOW 30 MIN: CPT | Mod: 25

## 2022-04-08 PROCEDURE — OTHER MIPS QUALITY: OTHER

## 2022-04-08 PROCEDURE — OTHER BIOPSY BY PUNCH METHOD: OTHER

## 2022-04-08 PROCEDURE — 11105 PUNCH BX SKIN EA SEP/ADDL: CPT

## 2022-04-08 PROCEDURE — 11104 PUNCH BX SKIN SINGLE LESION: CPT

## 2022-04-08 PROCEDURE — OTHER COUNSELING: OTHER

## 2022-04-08 ASSESSMENT — LOCATION SIMPLE DESCRIPTION DERM
LOCATION SIMPLE: RIGHT UPPER BACK
LOCATION SIMPLE: UPPER BACK
LOCATION SIMPLE: LEFT UPPER BACK
LOCATION SIMPLE: CHEST

## 2022-04-08 ASSESSMENT — LOCATION DETAILED DESCRIPTION DERM
LOCATION DETAILED: RIGHT SUPERIOR UPPER BACK
LOCATION DETAILED: SUPERIOR THORACIC SPINE
LOCATION DETAILED: LEFT SUPERIOR UPPER BACK
LOCATION DETAILED: LEFT MEDIAL INFERIOR CHEST

## 2022-04-08 ASSESSMENT — LOCATION ZONE DERM: LOCATION ZONE: TRUNK

## 2022-04-08 NOTE — PROCEDURE: COUNSELING
Detail Level: Detailed
Patient Specific Counseling (Will Not Stick From Patient To Patient): -Pt was present for a cyst removal/biopsy, there was a total of three, two has been removed and biopsies while the third pt will follow-up for removal of that cyst located on the left upper back.\\n-Pt understood instructions on when to follow up for suture removals and third cyst removal.

## 2022-04-08 NOTE — PROCEDURE: BIOPSY BY PUNCH METHOD
Post-Care Instructions: I reviewed with the patient in detail post-care instructions. Patient is to keep the biopsy site dry overnight, and then apply bacitracin twice daily until healed. Patient may apply hydrogen peroxide soaks to remove any crusting.
Patient Will Remove Sutures At Home?: No
Detail Level: Detailed
Dressing: bandage
Additional Anesthesia Volume In Cc (Will Not Render If 0): 0
Was A Bandage Applied: Yes
Suture Removal: 14 days
Biopsy Type: H and E
Anesthesia Volume In Cc (Will Not Render If 0): 0.5
Information: Selecting Yes will display possible errors in your note based on the variables you have selected. This validation is only offered as a suggestion for you. PLEASE NOTE THAT THE VALIDATION TEXT WILL BE REMOVED WHEN YOU FINALIZE YOUR NOTE. IF YOU WANT TO FAX A PRELIMINARY NOTE YOU WILL NEED TO TOGGLE THIS TO 'NO' IF YOU DO NOT WANT IT IN YOUR FAXED NOTE.
Billing Type: Client Bill
Wound Care: Petrolatum
Suture Removal: 9 days
Anesthesia Type: 1% lidocaine with epinephrine
Punch Size In Mm: 4
Epidermal Sutures: 4-0 Nylon
Hemostasis: None
Consent: Written consent was obtained and risks were reviewed including but not limited to scarring, infection, bleeding, scabbing, incomplete removal, nerve damage and allergy to anesthesia.
Home Suture Removal Text: Patient was provided a home suture removal kit and will remove their sutures at home.  If they have any questions or difficulties they will call the office.
Notification Instructions: Patient will be notified of biopsy results. However, patient instructed to call the office if not contacted within 2 weeks.
Billing Type: Third-Party Bill

## 2022-05-02 ENCOUNTER — APPOINTMENT (OUTPATIENT)
Dept: URBAN - METROPOLITAN AREA CLINIC 259 | Age: 62
Setting detail: DERMATOLOGY
End: 2022-05-02

## 2022-05-02 DIAGNOSIS — Z48.02 ENCOUNTER FOR REMOVAL OF SUTURES: ICD-10-CM

## 2022-05-02 PROCEDURE — OTHER SUTURE REMOVAL (GLOBAL PERIOD): OTHER

## 2022-05-02 PROCEDURE — 99024 POSTOP FOLLOW-UP VISIT: CPT

## 2022-05-02 ASSESSMENT — LOCATION SIMPLE DESCRIPTION DERM
LOCATION SIMPLE: LEFT UPPER BACK
LOCATION SIMPLE: RIGHT UPPER BACK

## 2022-05-02 ASSESSMENT — LOCATION DETAILED DESCRIPTION DERM
LOCATION DETAILED: LEFT SUPERIOR UPPER BACK
LOCATION DETAILED: RIGHT SUPERIOR UPPER BACK

## 2022-05-02 ASSESSMENT — LOCATION ZONE DERM: LOCATION ZONE: TRUNK

## 2022-05-02 NOTE — PROCEDURE: SUTURE REMOVAL (GLOBAL PERIOD)
Detail Level: Detailed
Add 25164 Cpt? (Important Note: In 2017 The Use Of 15691 Is Being Tracked By Cms To Determine Future Global Period Reimbursement For Global Periods): yes

## 2022-05-14 ENCOUNTER — HEALTH MAINTENANCE LETTER (OUTPATIENT)
Age: 62
End: 2022-05-14

## 2022-09-03 ENCOUNTER — HEALTH MAINTENANCE LETTER (OUTPATIENT)
Age: 62
End: 2022-09-03

## 2023-06-02 ENCOUNTER — HEALTH MAINTENANCE LETTER (OUTPATIENT)
Age: 63
End: 2023-06-02

## 2023-07-12 ENCOUNTER — APPOINTMENT (OUTPATIENT)
Dept: URBAN - METROPOLITAN AREA CLINIC 259 | Age: 63
Setting detail: DERMATOLOGY
End: 2023-07-13

## 2023-07-12 DIAGNOSIS — D49.2 NEOPLASM OF UNSPECIFIED BEHAVIOR OF BONE, SOFT TISSUE, AND SKIN: ICD-10-CM

## 2023-07-12 DIAGNOSIS — L72.8 OTHER FOLLICULAR CYSTS OF THE SKIN AND SUBCUTANEOUS TISSUE: ICD-10-CM

## 2023-07-12 PROCEDURE — OTHER COUNSELING: OTHER

## 2023-07-12 PROCEDURE — OTHER SHAVE REMOVAL: OTHER

## 2023-07-12 PROCEDURE — 11312 SHAVE SKIN LESION 1.1-2.0 CM: CPT

## 2023-07-12 PROCEDURE — 99213 OFFICE O/P EST LOW 20 MIN: CPT | Mod: 25

## 2023-07-12 PROCEDURE — OTHER MIPS QUALITY: OTHER

## 2023-07-12 PROCEDURE — OTHER DEFER: OTHER

## 2023-07-12 ASSESSMENT — LOCATION ZONE DERM
LOCATION ZONE: FACE
LOCATION ZONE: TRUNK

## 2023-07-12 ASSESSMENT — LOCATION SIMPLE DESCRIPTION DERM
LOCATION SIMPLE: RIGHT UPPER BACK
LOCATION SIMPLE: LEFT CHEEK

## 2023-07-12 ASSESSMENT — LOCATION DETAILED DESCRIPTION DERM
LOCATION DETAILED: RIGHT SUPERIOR LATERAL UPPER BACK
LOCATION DETAILED: LEFT INFERIOR CENTRAL MALAR CHEEK

## 2023-07-12 NOTE — PROCEDURE: MIPS QUALITY
Quality 226: Preventive Care And Screening: Tobacco Use: Screening And Cessation Intervention: Patient screened for tobacco use and is an ex/non-smoker
Quality 110: Preventive Care And Screening: Influenza Immunization: Influenza Immunization Ordered or Recommended, but not Administered due to system reason
Quality 111:Pneumonia Vaccination Status For Older Adults: Patient did not receive any pneumococcal conjugate or polysaccharide vaccine on or after their 60th birthday and before the end of the measurement period
Quality 130: Documentation Of Current Medications In The Medical Record: Current Medications Documented
Quality 431: Preventive Care And Screening: Unhealthy Alcohol Use - Screening: Patient not identified as an unhealthy alcohol user when screened for unhealthy alcohol use using a systematic screening method
Detail Level: Detailed

## 2023-07-12 NOTE — HPI: SKIN LESION
What Type Of Note Output Would You Prefer (Optional)?: Standard Output
Is This A New Presentation, Or A Follow-Up?: Skin Lesions
Additional History: Patient says he like the mole on his left cheek removed because he often cuts it when shaving. Patient reports having the lesion on the back excised in the past but it has since returned.

## 2023-07-12 NOTE — PROCEDURE: DEFER
Scheduling Instructions (Optional): 45 minutes
X Size Of Lesion In Cm (Optional): 0
Detail Level: Zone
Introduction Text (Please End With A Colon): The following procedure was deferred:
Procedure To Be Performed At Next Visit: Excision

## 2023-07-12 NOTE — PROCEDURE: SHAVE REMOVAL
Medical Necessity Information: It is in your best interest to select a reason for this procedure from the list below. All of these items fulfill various CMS LCD requirements except the new and changing color options.
Medical Necessity Clause: This procedure was medically necessary because the lesion that was treated was:
Lab: -[id number]
Lab Facility: 0
Detail Level: Detailed
Was A Bandage Applied: Yes
Size Of Lesion In Cm (Required): 1.2
Depth Of Shave: dermis
Biopsy Method: Dermablade
Anesthesia Type: 1% lidocaine with epinephrine
Anesthesia Volume In Cc: 0.5
Hemostasis: Drysol
Wound Care: Petrolatum
Render Path Notes In Note?: No
Consent was obtained from the patient. The risks and benefits to therapy were discussed in detail. Specifically, the risks of infection, scarring, bleeding, prolonged wound healing, incomplete removal, allergy to anesthesia, nerve injury and recurrence were addressed. Prior to the procedure, the treatment site was clearly identified and confirmed by the patient. All components of Universal Protocol/PAUSE Rule completed.
Post-Care Instructions: I reviewed with the patient in detail post-care instructions. Patient is to keep the biopsy site dry overnight, and then cleanse wound daily with soap and water and apply petrolatum daily until healed.
Notification Instructions: Patient will be notified of pathology results. However, patient instructed to call the office if not contacted within 2 weeks.
Billing Type: Third-Party Bill

## 2023-07-31 ENCOUNTER — APPOINTMENT (OUTPATIENT)
Dept: URBAN - METROPOLITAN AREA CLINIC 259 | Age: 63
Setting detail: DERMATOLOGY
End: 2023-08-01

## 2023-07-31 VITALS — HEIGHT: 68 IN | WEIGHT: 210 LBS

## 2023-07-31 DIAGNOSIS — L72.8 OTHER FOLLICULAR CYSTS OF THE SKIN AND SUBCUTANEOUS TISSUE: ICD-10-CM

## 2023-07-31 PROCEDURE — OTHER EDUCATIONAL RESOURCES PROVIDED: OTHER

## 2023-07-31 PROCEDURE — A4550 SURGICAL TRAYS: HCPCS

## 2023-07-31 PROCEDURE — OTHER PHOTO-DOCUMENTATION: OTHER

## 2023-07-31 PROCEDURE — OTHER MIPS QUALITY: OTHER

## 2023-07-31 PROCEDURE — 13100 CMPLX RPR TRUNK 1.1-2.5 CM: CPT

## 2023-07-31 PROCEDURE — OTHER EXCISION: OTHER

## 2023-07-31 PROCEDURE — 11402 EXC TR-EXT B9+MARG 1.1-2 CM: CPT

## 2023-07-31 ASSESSMENT — LOCATION DETAILED DESCRIPTION DERM: LOCATION DETAILED: RIGHT SUPERIOR UPPER BACK

## 2023-07-31 ASSESSMENT — LOCATION ZONE DERM: LOCATION ZONE: TRUNK

## 2023-07-31 ASSESSMENT — LOCATION SIMPLE DESCRIPTION DERM: LOCATION SIMPLE: RIGHT UPPER BACK

## 2023-07-31 NOTE — PROCEDURE: EXCISION

## 2023-08-14 ENCOUNTER — APPOINTMENT (OUTPATIENT)
Dept: URBAN - METROPOLITAN AREA CLINIC 259 | Age: 63
Setting detail: DERMATOLOGY
End: 2023-08-14

## 2023-08-14 DIAGNOSIS — Z48.02 ENCOUNTER FOR REMOVAL OF SUTURES: ICD-10-CM

## 2023-08-14 PROCEDURE — OTHER SUTURE REMOVAL (GLOBAL PERIOD): OTHER

## 2023-08-14 PROCEDURE — 99024 POSTOP FOLLOW-UP VISIT: CPT

## 2023-08-14 ASSESSMENT — LOCATION SIMPLE DESCRIPTION DERM: LOCATION SIMPLE: RIGHT UPPER BACK

## 2023-08-14 ASSESSMENT — LOCATION ZONE DERM: LOCATION ZONE: TRUNK

## 2023-08-14 ASSESSMENT — LOCATION DETAILED DESCRIPTION DERM: LOCATION DETAILED: RIGHT SUPERIOR UPPER BACK

## 2023-08-14 NOTE — PROCEDURE: SUTURE REMOVAL (GLOBAL PERIOD)
Detail Level: Detailed
Add 96896 Cpt? (Important Note: In 2017 The Use Of 52197 Is Being Tracked By Cms To Determine Future Global Period Reimbursement For Global Periods): yes

## (undated) DEVICE — ESU PENCIL W/HOLSTER

## (undated) DEVICE — BLADE DYONICS INCISOR PLUS ELITE 3.5MM 72200095

## (undated) DEVICE — SOL WATER IRRIG 1000ML BOTTLE 07139-09

## (undated) DEVICE — PACK ARTHROSCOPY KNEE SOP15AKFSM

## (undated) DEVICE — ESU GROUND PAD ADULT W/CORD E7507

## (undated) DEVICE — SYR BULB IRRIG DOVER 60 ML LATEX FREE 67000

## (undated) DEVICE — DRAPE SHEET 3/4 78X60"

## (undated) DEVICE — Device

## (undated) DEVICE — DRAPE STERI U 1015

## (undated) DEVICE — GLOVE PROTEXIS BLUE W/NEU-THERA 7.5  2D73EB75

## (undated) DEVICE — GLOVE PROTEXIS POWDER FREE 8.0 ORTHOPEDIC 2D73ET80

## (undated) DEVICE — BNDG ESMARK 6" STERILE

## (undated) DEVICE — BNDG ELASTIC 6"X5YDS UNSTERILE 6611-60

## (undated) DEVICE — GLOVE PROTEXIS W/NEU-THERA 8.0  2D73TE80

## (undated) DEVICE — SOL NACL 0.9% IRRIG 3000ML BAG 07972-08

## (undated) DEVICE — DRSG STERI STRIP 1/2X4" R1547

## (undated) DEVICE — SU PROLENE 3-0 PS-2 18" 8687H

## (undated) DEVICE — GLOVE PROTEXIS W/NEU-THERA 7.5  2D73TE75

## (undated) RX ORDER — FENTANYL CITRATE 50 UG/ML
INJECTION, SOLUTION INTRAMUSCULAR; INTRAVENOUS
Status: DISPENSED
Start: 2017-12-15

## (undated) RX ORDER — ACETAMINOPHEN 325 MG/1
TABLET ORAL
Status: DISPENSED
Start: 2017-12-15

## (undated) RX ORDER — ONDANSETRON 2 MG/ML
INJECTION INTRAMUSCULAR; INTRAVENOUS
Status: DISPENSED
Start: 2017-12-15

## (undated) RX ORDER — GLYCOPYRROLATE 0.2 MG/ML
INJECTION INTRAMUSCULAR; INTRAVENOUS
Status: DISPENSED
Start: 2017-12-15

## (undated) RX ORDER — ACETAMINOPHEN 120 MG/1
SUPPOSITORY RECTAL
Status: DISPENSED
Start: 2017-12-15

## (undated) RX ORDER — HYDROMORPHONE HYDROCHLORIDE 2 MG/ML
INJECTION, SOLUTION INTRAMUSCULAR; INTRAVENOUS; SUBCUTANEOUS
Status: DISPENSED
Start: 2017-12-15

## (undated) RX ORDER — GABAPENTIN 300 MG/1
CAPSULE ORAL
Status: DISPENSED
Start: 2017-12-15

## (undated) RX ORDER — KETOROLAC TROMETHAMINE 30 MG/ML
INJECTION, SOLUTION INTRAMUSCULAR; INTRAVENOUS
Status: DISPENSED
Start: 2017-12-15

## (undated) RX ORDER — LIDOCAINE HYDROCHLORIDE 20 MG/ML
INJECTION, SOLUTION EPIDURAL; INFILTRATION; INTRACAUDAL; PERINEURAL
Status: DISPENSED
Start: 2017-12-15

## (undated) RX ORDER — PROPOFOL 10 MG/ML
INJECTION, EMULSION INTRAVENOUS
Status: DISPENSED
Start: 2017-12-15